# Patient Record
Sex: MALE | Race: WHITE | NOT HISPANIC OR LATINO | Employment: OTHER | URBAN - METROPOLITAN AREA
[De-identification: names, ages, dates, MRNs, and addresses within clinical notes are randomized per-mention and may not be internally consistent; named-entity substitution may affect disease eponyms.]

---

## 2018-09-14 ENCOUNTER — TELEPHONE (OUTPATIENT)
Dept: CARDIOLOGY | Facility: CLINIC | Age: 73
End: 2018-09-14

## 2018-09-14 NOTE — TELEPHONE ENCOUNTER
Called pt to make his appointment, as I received his referral to est care closer to home. Pt stated he just seen his Cardiologist Monday and was told that he did not need to follow up for a year. Pt stated he did not want to make an appointment at this time but would need to est care within the next year. Pt was told that I would put in a recall letter for his to give us a call to make his appointment. Pt stated that would be fine and gave me his new address as he is moving to [facility]. Pt's addressed and phone number were updated and recall was put in. Pt was told to give us a call if he needed anything. Pt verbalized understanding. No further issues noted.

## 2019-08-12 ENCOUNTER — PATIENT OUTREACH (OUTPATIENT)
Dept: ADMINISTRATIVE | Facility: HOSPITAL | Age: 74
End: 2019-08-12

## 2019-08-12 NOTE — LETTER
August 12, 2019    Norris Nelson  [address]  [place] [address]             [facility]  [address]  [facility] [address]  Phone: [phone] Dear Norris Nelson,    [facility] is committed to your overall health.  To help you get the most out of each of your visits, we will review your information to make sure you are up to date on all of your recommended tests and/or procedures.      As a new patient to [facility], we may not have your complete medical records.  She has found that your chart shows you may be due for:    ANNUAL LABS    If you have had any of the above done at another facility, please bring the records or information with you so that your record at [facility] will be complete.      If you are currently taking medication, please bring it with you to your appointment for review.    Also, if you have any type of Advanced Directives, please bring them with you to your office visit so we may scan them into your chart.      Thank You,  Your [facility]  Yaritza Patricia L.P.N. Clinical Care Coordinator  [address] [place] [address], [place] [address] [phone]

## 2019-08-27 ENCOUNTER — OFFICE VISIT (OUTPATIENT)
Dept: FAMILY MEDICINE | Facility: CLINIC | Age: 74
End: 2019-08-27
Payer: MEDICARE

## 2019-08-27 VITALS
HEIGHT: 69 IN | TEMPERATURE: 98 F | WEIGHT: 188.19 LBS | BODY MASS INDEX: 27.87 KG/M2 | DIASTOLIC BLOOD PRESSURE: 71 MMHG | RESPIRATION RATE: 12 BRPM | OXYGEN SATURATION: 97 % | HEART RATE: 44 BPM | SYSTOLIC BLOOD PRESSURE: 138 MMHG

## 2019-08-27 DIAGNOSIS — Z76.89 ENCOUNTER TO ESTABLISH CARE: Primary | ICD-10-CM

## 2019-08-27 DIAGNOSIS — R00.1 BRADYCARDIA WITH 41-50 BEATS PER MINUTE: ICD-10-CM

## 2019-08-27 PROCEDURE — 99203 PR OFFICE/OUTPT VISIT, NEW, LEVL III, 30-44 MIN: ICD-10-PCS | Mod: S$GLB,,, | Performed by: NURSE PRACTITIONER

## 2019-08-27 PROCEDURE — 99203 OFFICE O/P NEW LOW 30 MIN: CPT | Mod: S$GLB,,, | Performed by: NURSE PRACTITIONER

## 2019-08-27 RX ORDER — LOSARTAN POTASSIUM 100 MG/1
50 TABLET ORAL DAILY
COMMUNITY
Start: 2019-07-22 | End: 2020-12-07

## 2019-08-27 RX ORDER — ATORVASTATIN CALCIUM 80 MG/1
40 TABLET, FILM COATED ORAL DAILY
COMMUNITY
Start: 2019-07-31 | End: 2020-12-07

## 2019-08-27 NOTE — PROGRESS NOTES
Subjective:       Patient ID: Norris Nelson is a 74 y.o. male.    Chief Complaint: Establish Care  New patient presents to establish care. Recently moved from around the [facility]. History viewed. Low heart rate discussed, educated and encouraged to monitor at home when doing home BP checks. Records brought with history updated by NP. Appt schedule with Dr. Ryan- no specific complaints encouraged to discuss left carotid stenosis mild noted in . H/o prostate cancer with possible referral to Dr. Haynes.     Past Medical History:   Diagnosis Date    Carotid stenosis, left     mild    Cyst of left kidney     7.4 cm    Hyperlipidemia 2009    Hypertension     Kidney stones     3 events    Prostate cancer        Past Surgical History:   Procedure Laterality Date    COLONOSCOPY      normal     CYST REMOVAL      TESTICLE    PROSTATE SURGERY      CANCER REMOVAL    TONSILLECTOMY  1950        Social History     Socioeconomic History    Marital status:      Spouse name: Not on file    Number of children: 3    Years of education: Not on file    Highest education level: Master's degree (e.g., MA, MS, Vanessa, MEd, MSW, NELDA)   Occupational History    Occupation: Retired-[employer] worker   Social Needs    Financial resource strain: Not on file    Food insecurity:     Worry: Not on file     Inability: Not on file    Transportation needs:     Medical: Not on file     Non-medical: Not on file   Tobacco Use    Smoking status: Former Smoker     Types: Cigarettes     Last attempt to quit: 1974     Years since quittin.6    Smokeless tobacco: Never Used   Substance and Sexual Activity    Alcohol use: Yes     Comment: Occasionally    Drug use: Never    Sexual activity: Yes     Partners: Female   Lifestyle    Physical activity:     Days per week: Not on file     Minutes per session: Not on file    Stress: Not on file   Relationships    Social connections:      Talks on phone: Not on file     Gets together: Not on file     Attends Muslim service: Not on file     Active member of club or organization: Not on file     Attends meetings of clubs or organizations: Not on file     Relationship status: Not on file   Other Topics Concern    Not on file   Social History Narrative    Not on file       Family History   Problem Relation Age of Onset    Stroke Mother     Early death Mother         39y/o smoker     Heart attack Father         69 y/o    Obesity Sister     Heart attack Brother     Pacemaker/defibrilator Brother        Review of patient's allergies indicates:  No Known Allergies       Current Outpatient Medications:     atorvastatin (LIPITOR) 80 MG tablet, Take 40 mg by mouth once daily. , Disp: , Rfl:     losartan (COZAAR) 100 MG tablet, Take 50 mg by mouth once daily. , Disp: , Rfl:     HPI  Review of Systems   Constitutional: Negative.    HENT: Negative.    Eyes: Negative.    Respiratory: Negative.    Cardiovascular: Negative.    Gastrointestinal: Negative.    Endocrine: Negative.    Genitourinary: Negative.    Musculoskeletal: Negative.    Skin: Negative.    Allergic/Immunologic: Negative.    Neurological: Negative.    Hematological: Negative.    Psychiatric/Behavioral: Negative.        Objective:      Physical Exam   Constitutional: He is oriented to person, place, and time. He appears well-developed and well-nourished.   HENT:   Head: Normocephalic and atraumatic.   Mouth/Throat: Oropharynx is clear and moist.   Eyes: Pupils are equal, round, and reactive to light. Conjunctivae are normal. No scleral icterus.   Neck: Normal range of motion. Neck supple. No thyromegaly present.   Cardiovascular: Normal rate, regular rhythm and normal heart sounds.   No murmur heard.  Pulmonary/Chest: Effort normal and breath sounds normal. No respiratory distress.   Abdominal: Soft. Bowel sounds are normal. He exhibits no distension. There is no tenderness.    Musculoskeletal: Normal range of motion. He exhibits no edema.   Neurological: He is alert and oriented to person, place, and time. No sensory deficit. He exhibits normal muscle tone.   Skin: Skin is warm. Capillary refill takes less than 2 seconds. No rash noted.   Psychiatric: He has a normal mood and affect. His behavior is normal. Judgment and thought content normal.   Nursing note and vitals reviewed.      Assessment:       1. Encounter to establish care    2. Bradycardia with 41-50 beats per minute        Plan:     1- patient to call back when he is ready to have blood work ordered as he wants to ensure PSA has been greater than one year.    Encounter to establish care    Bradycardia with 41-50 beats per minute        Risks, benefits, and side effects were discussed with the patient. All questions were answered to the fullest satisfaction of the patient, and pt verbalized understanding and agreement to treatment plan. Pt was to call with any new or worsening symptoms, or present to the ER.

## 2019-10-07 ENCOUNTER — OFFICE VISIT (OUTPATIENT)
Dept: CARDIOLOGY | Facility: CLINIC | Age: 74
End: 2019-10-07
Payer: MEDICARE

## 2019-10-07 VITALS
RESPIRATION RATE: 12 BRPM | BODY MASS INDEX: 27.3 KG/M2 | HEIGHT: 69 IN | OXYGEN SATURATION: 96 % | WEIGHT: 184.31 LBS | DIASTOLIC BLOOD PRESSURE: 62 MMHG | TEMPERATURE: 98 F | SYSTOLIC BLOOD PRESSURE: 122 MMHG | HEART RATE: 51 BPM

## 2019-10-07 DIAGNOSIS — E65 ABDOMINAL OBESITY: ICD-10-CM

## 2019-10-07 DIAGNOSIS — R00.1 BRADYCARDIA WITH 51-60 BEATS PER MINUTE: ICD-10-CM

## 2019-10-07 DIAGNOSIS — I65.29 ASYMPTOMATIC CAROTID ARTERY STENOSIS, UNSPECIFIED LATERALITY: Primary | ICD-10-CM

## 2019-10-07 DIAGNOSIS — I10 ESSENTIAL HYPERTENSION: ICD-10-CM

## 2019-10-07 DIAGNOSIS — R09.89 BRUIT OF LEFT CAROTID ARTERY: ICD-10-CM

## 2019-10-07 PROBLEM — I65.22 STENOSIS OF LEFT CAROTID ARTERY: Status: ACTIVE | Noted: 2019-10-07

## 2019-10-07 PROCEDURE — 99213 OFFICE O/P EST LOW 20 MIN: CPT | Mod: PBBFAC | Performed by: INTERNAL MEDICINE

## 2019-10-07 PROCEDURE — 93005 ELECTROCARDIOGRAM TRACING: CPT

## 2019-10-07 PROCEDURE — 99205 PR OFFICE/OUTPT VISIT, NEW, LEVL V, 60-74 MIN: ICD-10-PCS | Mod: S$PBB,25,, | Performed by: INTERNAL MEDICINE

## 2019-10-07 PROCEDURE — 93010 ELECTROCARDIOGRAM REPORT: CPT | Mod: ,,, | Performed by: INTERNAL MEDICINE

## 2019-10-07 PROCEDURE — 99205 OFFICE O/P NEW HI 60 MIN: CPT | Mod: S$PBB,25,, | Performed by: INTERNAL MEDICINE

## 2019-10-07 PROCEDURE — 99999 PR PBB SHADOW E&M-EST. PATIENT-LVL III: ICD-10-PCS | Mod: PBBFAC,,, | Performed by: INTERNAL MEDICINE

## 2019-10-07 PROCEDURE — 93010 EKG 12-LEAD: ICD-10-PCS | Mod: ,,, | Performed by: INTERNAL MEDICINE

## 2019-10-07 PROCEDURE — 99999 PR PBB SHADOW E&M-EST. PATIENT-LVL III: CPT | Mod: PBBFAC,,, | Performed by: INTERNAL MEDICINE

## 2019-10-07 NOTE — PATIENT INSTRUCTIONS

## 2019-10-07 NOTE — PROGRESS NOTES
" Patient ID:  Norris Nelson is a 74 y.o. male who presents to Establish Care  For cardiac follow up, HTN, HLD, mild Carotid stenosis  PCP and referred by Michelle Barfield NP  Prior cardiologist, see yearly, Dr. Ji in [place]  Lives with wife, Soleadd, non-smoker  Retired  of water and sewage treatment    Patient is a new patient to me.    Health literacy: Medium  Activities: ADL's, walks the beach, 3 miles a day 45-60 minutes.  Nicotine: Quit smoking in , 1 pack/day x 10+ years  Alcohol: Socially, 1-2 beer/cocktail per week  Illicit drugs: Denies   Cardiac symptoms: None at present  Home BP: Yes - Avg 110/70's  Medication compliance: Yes  Diet: regular  Caffeine: 2 cups/day, 1 coke/day  Labs: No current labs <1 year  Last Echo: 5-10 years ago  Last stress test: 5-10 years ago  Cardiovascular angiogram: None  ECG: SB, rate 51 with PAC  Fundoscopic exam: 2017, no retinopathy detected    WM here for annual follow up, no specific heart condition but had premature family history for CAD and stoke, mother in her 40s and father  at age 68. No heart worries and no cardiac symptoms.     Ms. Michelle Barfield NP noted "Appt schedule with Dr. Ryan- no specific complaints encouraged to discuss left carotid stenosis mild noted in ."       Review of Systems   Constitution: Negative. Negative for diaphoresis, fever, malaise/fatigue, night sweats and weight gain.   HENT: Positive for hearing loss (Mederate hearing loss AU). Negative for nosebleeds and tinnitus.    Eyes: Negative for visual disturbance.        Wears readers   Cardiovascular: Negative.  Negative for chest pain, claudication, cyanosis, dyspnea on exertion, irregular heartbeat, leg swelling, near-syncope, orthopnea, palpitations and paroxysmal nocturnal dyspnea.   Respiratory: Negative.  Negative for cough, shortness of breath, sleep disturbances due to breathing, snoring and wheezing.         [place] = 8 " "  Endocrine: Negative.  Negative for polydipsia and polyuria.   Hematologic/Lymphatic: Negative.  Does not bruise/bleed easily.   Skin: Positive for color change (When bumps into something, states skin is very thin and easily tears). Negative for flushing, nail changes, poor wound healing and suspicious lesions.   Musculoskeletal: Positive for arthritis (Knees), back pain (Lower back with no radiation), joint pain (Knees) and muscle cramps (occassional Lower ext). Negative for falls, gout, joint swelling, muscle weakness and myalgias.   Gastrointestinal: Negative.  Negative for heartburn, hematemesis, hematochezia, melena and nausea.   Genitourinary: Negative.    Neurological: Negative.  Negative for disturbances in coordination, excessive daytime sleepiness, dizziness, focal weakness, headaches, light-headedness, loss of balance, numbness, vertigo and weakness.   Psychiatric/Behavioral: Negative.  Negative for depression and substance abuse. The patient does not have insomnia and is not nervous/anxious.    Allergic/Immunologic: Negative.         Objective:    Physical Exam   Constitutional: He is oriented to person, place, and time. He appears well-developed and well-nourished.   HENT:   Head: Normocephalic.   Eyes: Pupils are equal, round, and reactive to light. Conjunctivae and EOM are normal.   Neck: Normal range of motion. Neck supple. No JVD present. No thyromegaly present.   Circumference 15"   Cardiovascular: Regular rhythm and intact distal pulses. Bradycardia present. Exam reveals distant heart sounds. Exam reveals no gallop and no friction rub.   No murmur heard.  Pulses:       Carotid pulses are 1+ on the right side, and 1+ on the left side with bruit.       Radial pulses are 1+ on the right side, and 1+ on the left side.        Femoral pulses are 1+ on the right side, and 1+ on the left side.       Popliteal pulses are 1+ on the right side, and 1+ on the left side.        Dorsalis pedis pulses are 1+ on " "the right side, and 1+ on the left side.        Posterior tibial pulses are 1+ on the right side, and 1+ on the left side.   Pulmonary/Chest: Effort normal and breath sounds normal. He has no rales. He exhibits no tenderness.   Abdominal: Soft. Bowel sounds are normal. There is no tenderness.   Waist 41.75", hip 40"   Musculoskeletal: Normal range of motion. He exhibits no edema.   Lymphadenopathy:     He has no cervical adenopathy.   Neurological: He is alert and oriented to person, place, and time.   Skin: Skin is warm and dry. No rash noted.         Assessment:       1. Asymptomatic carotid artery stenosis, unspecified laterality    2. Bradycardia with 51-60 beats per minute    3. Essential hypertension, dx 2010    4. Bruit of left carotid artery    5. BMI 27.0-27.9,adult    6. Abdominal obesity         Plan:         Asymptomatic carotid artery stenosis, unspecified laterality  -     IN OFFICE EKG 12-LEAD (to Muse)  -     Stress Echo; Future  -     Lipid panel; Future; Expected date: 10/07/2019  -     US Carotid Bilateral; Future; Expected date: 10/07/2019    Bradycardia with 51-60 beats per minute    Essential hypertension, dx 2010  -     Stress Echo; Future    Bruit of left carotid artery  -     US Carotid Bilateral; Future; Expected date: 10/07/2019    BMI 27.0-27.9,adult    Abdominal obesity    - All medical issues reviewed, continue current Rx.  - CV status stable, all medications reviewed, patient acknowledge good understanding.  - Recommend healthy living: moderate alcohol, healthy diet and regular exercise aiming for fitness, and weight control  - Discussed healthy daily limit of 1 oz of pure alcohol in any 24 hours (roughly 2 12-oz beers, 8 oz of wine (8%-12% alcohol), or 2.5 oz of liquor (80 proof)), can not save up.   - Instruction for Mediterranean diet and heart healthy exercise given.  - Check home blood pressure, 2 days weekly, do 2 readings within 5 minutes in AM and PM, keep log for " review.  - Have to do some abdominal exercise to rid belly fat  - Highly recommend 30 minutes of exercise / activities daily, can have Sunday off, with 2-3 sessions of muscle strengthening weekly. A  would be very helpful.  - Recommend at least annual cardiovascular evaluation in view of patient's significant risk factors.  - Phone review / encourage use of [facility], no [facility]  - The tech support at [facility] is available 5 days a week, from 9 to 5, at [phone]. \    Total face-to-face time with the patient was 40 minutes and greater than 50% was spent in counseling and coordination of care. The above assessment and plan have been discussed at length. Referring provider's note reviewed. Labs and procedure over the last 6 months reviewed. Problem List updated. Asked to bring in all active medications / pills bottles with next visit.

## 2020-02-28 ENCOUNTER — HOSPITAL ENCOUNTER (OUTPATIENT)
Dept: CARDIOLOGY | Facility: HOSPITAL | Age: 75
Discharge: HOME | End: 2020-02-28
Attending: INTERNAL MEDICINE
Payer: MEDICARE

## 2020-02-28 ENCOUNTER — HOSPITAL ENCOUNTER (OUTPATIENT)
Dept: RADIOLOGY | Facility: HOSPITAL | Age: 75
Discharge: HOME | End: 2020-02-28
Attending: INTERNAL MEDICINE
Payer: MEDICARE

## 2020-02-28 VITALS
SYSTOLIC BLOOD PRESSURE: 154 MMHG | BODY MASS INDEX: 27.17 KG/M2 | WEIGHT: 184 LBS | DIASTOLIC BLOOD PRESSURE: 76 MMHG | HEART RATE: 58 BPM

## 2020-02-28 DIAGNOSIS — I65.29 ASYMPTOMATIC CAROTID ARTERY STENOSIS, UNSPECIFIED LATERALITY: ICD-10-CM

## 2020-02-28 DIAGNOSIS — I10 ESSENTIAL HYPERTENSION: ICD-10-CM

## 2020-02-28 DIAGNOSIS — R09.89 BRUIT OF LEFT CAROTID ARTERY: ICD-10-CM

## 2020-02-28 LAB
AORTIC ROOT ANNULUS: 3.35 CM
AORTIC VALVE CUSP SEPERATION: 2.05 CM
CV ECHO LV RWT: 0.35 CM
CV STRESS BASE HR: 44 BPM
DIASTOLIC BLOOD PRESSURE: 99 MMHG
DOP CALC LVOT AREA: 3.1 CM2
DOP CALC LVOT DIAMETER: 1.98 CM
ECHO LV POSTERIOR WALL: 0.94 CM (ref 0.6–1.1)
FRACTIONAL SHORTENING: 34 % (ref 28–44)
INTERVENTRICULAR SEPTUM: 0.94 CM (ref 0.6–1.1)
LA MAJOR: 4.73 CM
LA MINOR: 3.48 CM
LEFT ATRIUM SIZE: 4.43 CM
LEFT CCA DIST DIAS: 18 CM/S
LEFT CCA DIST SYS: 93 CM/S
LEFT CCA MID DIAS: 18 CM/S
LEFT CCA MID SYS: 107 CM/S
LEFT CCA PROX DIAS: 19 CM/S
LEFT CCA PROX SYS: 114 CM/S
LEFT ECA DIAS: 11 CM/S
LEFT ECA SYS: 75 CM/S
LEFT ICA DIST DIAS: 22 CM/S
LEFT ICA DIST SYS: 86 CM/S
LEFT ICA MID DIAS: 21 CM/S
LEFT ICA MID SYS: 68 CM/S
LEFT ICA PROX DIAS: 22 CM/S
LEFT ICA PROX SYS: 64 CM/S
LEFT INTERNAL DIMENSION IN SYSTOLE: 3.53 CM (ref 2.1–4)
LEFT VENTRICLE DIASTOLIC VOLUME: 139.47 ML
LEFT VENTRICLE SYSTOLIC VOLUME: 51.98 ML
LEFT VENTRICULAR INTERNAL DIMENSION IN DIASTOLE: 5.37 CM (ref 3.5–6)
LEFT VENTRICULAR MASS: 188.82 G
LEFT VERTEBRAL DIAS: 16 CM/S
LEFT VERTEBRAL SYS: 69 CM/S
OHS CV CAROTID RIGHT ICA EDV HIGHEST: 23
OHS CV CAROTID ULTRASOUND LEFT ICA/CCA RATIO: 0.75
OHS CV CAROTID ULTRASOUND RIGHT ICA/CCA RATIO: 0.8
OHS CV CPX 1 MINUTE RECOVERY HEART RATE: 110 BPM
OHS CV CPX 85 PERCENT MAX PREDICTED HEART RATE MALE: 122
OHS CV CPX ESTIMATED METS: 12
OHS CV CPX MAX PREDICTED HEART RATE: 143
OHS CV CPX PATIENT IS FEMALE: 0
OHS CV CPX PATIENT IS MALE: 1
OHS CV CPX PEAK DIASTOLIC BLOOD PRESSURE: 75 MMHG
OHS CV CPX PEAK HEAR RATE: 150 BPM
OHS CV CPX PEAK RATE PRESSURE PRODUCT: NORMAL
OHS CV CPX PEAK SYSTOLIC BLOOD PRESSURE: 241 MMHG
OHS CV CPX PERCENT MAX PREDICTED HEART RATE ACHIEVED: 105
OHS CV CPX RATE PRESSURE PRODUCT PRESENTING: 6732
OHS CV PV CAROTID LEFT HIGHEST CCA: 114
OHS CV PV CAROTID LEFT HIGHEST ICA: 86
OHS CV PV CAROTID RIGHT HIGHEST CCA: 106
OHS CV PV CAROTID RIGHT HIGHEST ICA: 85
OHS CV US CAROTID LEFT HIGHEST EDV: 22
RA MAJOR: 4.93 CM
RA WIDTH: 3.8 CM
RIGHT CCA DIST DIAS: 20 CM/S
RIGHT CCA DIST SYS: 99 CM/S
RIGHT CCA MID DIAS: 21 CM/S
RIGHT CCA MID SYS: 106 CM/S
RIGHT CCA PROX DIAS: 19 CM/S
RIGHT CCA PROX SYS: 102 CM/S
RIGHT ECA DIAS: 16 CM/S
RIGHT ECA SYS: 77 CM/S
RIGHT ICA DIST DIAS: 20 CM/S
RIGHT ICA DIST SYS: 72 CM/S
RIGHT ICA MID DIAS: 23 CM/S
RIGHT ICA MID SYS: 66 CM/S
RIGHT ICA PROX DIAS: 18 CM/S
RIGHT ICA PROX SYS: 85 CM/S
RIGHT VENTRICULAR END-DIASTOLIC DIMENSION: 3.11 CM
RIGHT VERTEBRAL DIAS: 14 CM/S
RIGHT VERTEBRAL SYS: 61 CM/S
STRESS ECHO POST EXERCISE DUR MIN: 7 MINUTES
STRESS ECHO POST EXERCISE DUR SEC: 45 SECONDS
SYSTOLIC BLOOD PRESSURE: 153 MMHG

## 2020-02-28 PROCEDURE — 93351 STRESS TTE COMPLETE: CPT | Mod: 26,,, | Performed by: INTERNAL MEDICINE

## 2020-02-28 PROCEDURE — 93880 EXTRACRANIAL BILAT STUDY: CPT

## 2020-02-28 PROCEDURE — 93351 STRESS ECHO (CUPID ONLY): ICD-10-PCS | Mod: 26,,, | Performed by: INTERNAL MEDICINE

## 2020-02-28 PROCEDURE — 93880 EXTRACRANIAL BILAT STUDY: CPT | Mod: 26,,, | Performed by: INTERNAL MEDICINE

## 2020-02-28 PROCEDURE — 93880 CV US DOPPLER CAROTID (CUPID ONLY): ICD-10-PCS | Mod: 26,,, | Performed by: INTERNAL MEDICINE

## 2020-02-28 PROCEDURE — 93351 STRESS TTE COMPLETE: CPT

## 2020-02-28 NOTE — NURSING
2 patient identifier name and date of birth confirmed as stated by patient and matched with armband. Informed consent obtained.

## 2020-03-16 ENCOUNTER — TELEPHONE (OUTPATIENT)
Dept: FAMILY MEDICINE | Facility: CLINIC | Age: 75
End: 2020-03-16

## 2020-03-16 NOTE — TELEPHONE ENCOUNTER
Attempted to call no answer, unable to leave message.        ----- Message from Alberto Ferguson sent at 3/16/2020  3:59 PM CDT -----  Contact: pt wife   Type: Needs Medical Advice    Who Called:  Pt wife Celsa   Symptoms (please be specific):    How long has patient had these symptoms:    Pharmacy name and phone #:    Best Call Back Number:    Additional Information: he thinks he might have a kidney stone pain , wife doesn't know what to do about it

## 2020-03-17 ENCOUNTER — HOSPITAL ENCOUNTER (EMERGENCY)
Facility: HOSPITAL | Age: 75
Discharge: HOME | End: 2020-03-17
Attending: FAMILY MEDICINE
Payer: MEDICARE

## 2020-03-17 VITALS
WEIGHT: 184 LBS | OXYGEN SATURATION: 96 % | HEIGHT: 69 IN | RESPIRATION RATE: 18 BRPM | BODY MASS INDEX: 27.25 KG/M2 | HEART RATE: 58 BPM | SYSTOLIC BLOOD PRESSURE: 127 MMHG | TEMPERATURE: 98 F | DIASTOLIC BLOOD PRESSURE: 68 MMHG

## 2020-03-17 DIAGNOSIS — N20.1 URETEROLITHIASIS: Primary | ICD-10-CM

## 2020-03-17 DIAGNOSIS — N13.2 HYDRONEPHROSIS WITH URINARY OBSTRUCTION DUE TO URETERAL CALCULUS: ICD-10-CM

## 2020-03-17 LAB
ALBUMIN SERPL BCP-MCNC: 4.4 G/DL (ref 3.5–5.2)
ALP SERPL-CCNC: 68 U/L (ref 55–135)
ALT SERPL W/O P-5'-P-CCNC: 17 U/L (ref 10–44)
ANION GAP SERPL CALC-SCNC: 14 MMOL/L (ref 8–16)
AST SERPL-CCNC: 25 U/L (ref 10–40)
BASOPHILS # BLD AUTO: 0.03 K/UL (ref 0–0.2)
BASOPHILS NFR BLD: 0.3 % (ref 0–1.9)
BILIRUB SERPL-MCNC: 1.1 MG/DL (ref 0.1–1)
BUN SERPL-MCNC: 22 MG/DL (ref 8–23)
CALCIUM SERPL-MCNC: 9.3 MG/DL (ref 8.7–10.5)
CHLORIDE SERPL-SCNC: 105 MMOL/L (ref 95–110)
CO2 SERPL-SCNC: 20 MMOL/L (ref 23–29)
CREAT SERPL-MCNC: 1.8 MG/DL (ref 0.5–1.4)
DIFFERENTIAL METHOD: ABNORMAL
EOSINOPHIL # BLD AUTO: 0 K/UL (ref 0–0.5)
EOSINOPHIL NFR BLD: 0.2 % (ref 0–8)
ERYTHROCYTE [DISTWIDTH] IN BLOOD BY AUTOMATED COUNT: 12.6 % (ref 11.5–14.5)
EST. GFR  (AFRICAN AMERICAN): 41.1 ML/MIN/1.73 M^2
EST. GFR  (NON AFRICAN AMERICAN): 35.5 ML/MIN/1.73 M^2
GLUCOSE SERPL-MCNC: 142 MG/DL (ref 70–110)
HCT VFR BLD AUTO: 40.6 % (ref 40–54)
HGB BLD-MCNC: 13.5 G/DL (ref 14–18)
IMM GRANULOCYTES # BLD AUTO: 0.03 K/UL (ref 0–0.04)
IMM GRANULOCYTES NFR BLD AUTO: 0.3 % (ref 0–0.5)
LYMPHOCYTES # BLD AUTO: 0.7 K/UL (ref 1–4.8)
LYMPHOCYTES NFR BLD: 7.7 % (ref 18–48)
MCH RBC QN AUTO: 31.3 PG (ref 27–31)
MCHC RBC AUTO-ENTMCNC: 33.3 G/DL (ref 32–36)
MCV RBC AUTO: 94 FL (ref 82–98)
MONOCYTES # BLD AUTO: 0.5 K/UL (ref 0.3–1)
MONOCYTES NFR BLD: 5.1 % (ref 4–15)
NEUTROPHILS # BLD AUTO: 8.1 K/UL (ref 1.8–7.7)
NEUTROPHILS NFR BLD: 86.4 % (ref 38–73)
NRBC BLD-RTO: 0 /100 WBC
PLATELET # BLD AUTO: 201 K/UL (ref 150–350)
PMV BLD AUTO: 10.7 FL (ref 9.2–12.9)
POTASSIUM SERPL-SCNC: 3.9 MMOL/L (ref 3.5–5.1)
PROT SERPL-MCNC: 7.7 G/DL (ref 6–8.4)
RBC # BLD AUTO: 4.31 M/UL (ref 4.6–6.2)
SODIUM SERPL-SCNC: 139 MMOL/L (ref 136–145)
WBC # BLD AUTO: 9.34 K/UL (ref 3.9–12.7)

## 2020-03-17 PROCEDURE — 85025 COMPLETE CBC W/AUTO DIFF WBC: CPT

## 2020-03-17 PROCEDURE — 63600175 PHARM REV CODE 636 W HCPCS: Performed by: FAMILY MEDICINE

## 2020-03-17 PROCEDURE — 96361 HYDRATE IV INFUSION ADD-ON: CPT

## 2020-03-17 PROCEDURE — 25000003 PHARM REV CODE 250: Performed by: FAMILY MEDICINE

## 2020-03-17 PROCEDURE — 96372 THER/PROPH/DIAG INJ SC/IM: CPT | Mod: 59

## 2020-03-17 PROCEDURE — 74176 CT ABD & PELVIS W/O CONTRAST: CPT | Mod: TC

## 2020-03-17 PROCEDURE — 74176 CT RENAL STONE STUDY ABD PELVIS WO: ICD-10-PCS | Mod: 26,,, | Performed by: RADIOLOGY

## 2020-03-17 PROCEDURE — 80053 COMPREHEN METABOLIC PANEL: CPT

## 2020-03-17 PROCEDURE — 96374 THER/PROPH/DIAG INJ IV PUSH: CPT

## 2020-03-17 PROCEDURE — 74176 CT ABD & PELVIS W/O CONTRAST: CPT | Mod: 26,,, | Performed by: RADIOLOGY

## 2020-03-17 PROCEDURE — 99284 EMERGENCY DEPT VISIT MOD MDM: CPT | Mod: 25

## 2020-03-17 RX ORDER — HYDROCODONE BITARTRATE AND ACETAMINOPHEN 5; 325 MG/1; MG/1
1 TABLET ORAL
Status: COMPLETED | OUTPATIENT
Start: 2020-03-17 | End: 2020-03-17

## 2020-03-17 RX ORDER — KETOROLAC TROMETHAMINE 30 MG/ML
15 INJECTION, SOLUTION INTRAMUSCULAR; INTRAVENOUS
Status: COMPLETED | OUTPATIENT
Start: 2020-03-17 | End: 2020-03-17

## 2020-03-17 RX ORDER — KETOROLAC TROMETHAMINE 10 MG/1
10 TABLET, FILM COATED ORAL EVERY 6 HOURS PRN
Qty: 20 TABLET | Refills: 0 | Status: SHIPPED | OUTPATIENT
Start: 2020-03-17 | End: 2020-12-08

## 2020-03-17 RX ORDER — TAMSULOSIN HYDROCHLORIDE 0.4 MG/1
0.4 CAPSULE ORAL DAILY
COMMUNITY
End: 2020-03-17 | Stop reason: SDUPTHER

## 2020-03-17 RX ORDER — TAMSULOSIN HYDROCHLORIDE 0.4 MG/1
0.4 CAPSULE ORAL
Status: COMPLETED | OUTPATIENT
Start: 2020-03-17 | End: 2020-03-17

## 2020-03-17 RX ORDER — HYDROCODONE BITARTRATE AND ACETAMINOPHEN 7.5; 325 MG/1; MG/1
1 TABLET ORAL EVERY 6 HOURS PRN
Qty: 20 TABLET | Refills: 0 | Status: SHIPPED | OUTPATIENT
Start: 2020-03-17 | End: 2020-11-17

## 2020-03-17 RX ORDER — TAMSULOSIN HYDROCHLORIDE 0.4 MG/1
0.4 CAPSULE ORAL DAILY
Qty: 20 CAPSULE | Refills: 0 | Status: SHIPPED | OUTPATIENT
Start: 2020-03-17 | End: 2020-11-17

## 2020-03-17 RX ORDER — MORPHINE SULFATE 4 MG/ML
4 INJECTION, SOLUTION INTRAMUSCULAR; INTRAVENOUS
Status: DISCONTINUED | OUTPATIENT
Start: 2020-03-17 | End: 2020-03-17

## 2020-03-17 RX ORDER — HYDROCODONE BITARTRATE AND ACETAMINOPHEN 7.5; 325 MG/1; MG/1
1 TABLET ORAL EVERY 6 HOURS PRN
COMMUNITY
End: 2020-03-17 | Stop reason: SDUPTHER

## 2020-03-17 RX ORDER — KETOROLAC TROMETHAMINE 30 MG/ML
30 INJECTION, SOLUTION INTRAMUSCULAR; INTRAVENOUS
Status: COMPLETED | OUTPATIENT
Start: 2020-03-17 | End: 2020-03-17

## 2020-03-17 RX ORDER — KETOROLAC TROMETHAMINE 30 MG/ML
15 INJECTION, SOLUTION INTRAMUSCULAR; INTRAVENOUS
Status: DISCONTINUED | OUTPATIENT
Start: 2020-03-17 | End: 2020-03-17

## 2020-03-17 RX ORDER — ONDANSETRON 2 MG/ML
4 INJECTION INTRAMUSCULAR; INTRAVENOUS
Status: DISCONTINUED | OUTPATIENT
Start: 2020-03-17 | End: 2020-03-17

## 2020-03-17 RX ADMIN — HYDROCODONE BITARTRATE AND ACETAMINOPHEN 1 TABLET: 5; 325 TABLET ORAL at 03:03

## 2020-03-17 RX ADMIN — KETOROLAC TROMETHAMINE 30 MG: 30 INJECTION, SOLUTION INTRAMUSCULAR at 03:03

## 2020-03-17 RX ADMIN — SODIUM CHLORIDE 500 ML: 0.9 INJECTION, SOLUTION INTRAVENOUS at 02:03

## 2020-03-17 RX ADMIN — KETOROLAC TROMETHAMINE 15 MG: 30 INJECTION, SOLUTION INTRAMUSCULAR at 02:03

## 2020-03-17 RX ADMIN — TAMSULOSIN HYDROCHLORIDE 0.4 MG: 0.4 CAPSULE ORAL at 03:03

## 2020-03-17 NOTE — ED PROVIDER NOTES
Encounter Date: 3/17/2020       History     Chief Complaint   Patient presents with    Flank Pain     right flank pain      This is a 75 year old male with history of kidney stones who comes in with right flank pain that was 10/10 initially but resolved now.  He had no associated N/V.  No fever/chills.  He took norco and flomax prior to coming in and was in a hot bath.          Review of patient's allergies indicates:  No Known Allergies  Past Medical History:   Diagnosis Date    Carotid stenosis, left     mild    Cyst of left kidney     7.4 cm    Hyperlipidemia     Hypertension     Kidney stones     3 events    Kidney stones 2018    Prostate cancer 1995     Past Surgical History:   Procedure Laterality Date    COLONOSCOPY      normal     CYST REMOVAL      TESTICLE    PROSTATE SURGERY      CANCER REMOVAL    TONSILLECTOMY  1950     Family History   Problem Relation Age of Onset    Stroke Mother     Early death Mother         41y/o smoker     Heart attack Father         67 y/o    Obesity Sister     Heart attack Brother     Pacemaker/defibrilator Brother      Social History     Tobacco Use    Smoking status: Former Smoker     Types: Cigarettes     Last attempt to quit: 1974     Years since quittin.2    Smokeless tobacco: Never Used   Substance Use Topics    Alcohol use: Yes     Comment: Occasionally    Drug use: Never     Review of Systems   All other systems reviewed and are negative.      Physical Exam     Initial Vitals [20 0229]   BP Pulse Resp Temp SpO2   127/68 (!) 58 18 98 °F (36.7 °C) 96 %      MAP       --         Physical Exam    Nursing note and vitals reviewed.  Constitutional: He appears well-developed and well-nourished. No distress.   HENT:   Head: Normocephalic and atraumatic.   Eyes: Conjunctivae are normal. Pupils are equal, round, and reactive to light.   Neck: Normal range of motion. Neck supple.   Cardiovascular: Normal rate,  regular rhythm and intact distal pulses.   Pulmonary/Chest: Breath sounds normal.   Abdominal: Soft. Bowel sounds are normal. He exhibits no distension. There is no tenderness.   Musculoskeletal: Normal range of motion.   Neurological: He is alert and oriented to person, place, and time. He has normal strength.   Skin: Skin is warm. Capillary refill takes less than 2 seconds.   Psychiatric: He has a normal mood and affect. Thought content normal.         ED Course   Procedures  Labs Reviewed   CBC W/ AUTO DIFFERENTIAL - Abnormal; Notable for the following components:       Result Value    RBC 4.31 (*)     Hemoglobin 13.5 (*)     Mean Corpuscular Hemoglobin 31.3 (*)     Gran # (ANC) 8.1 (*)     Lymph # 0.7 (*)     Gran% 86.4 (*)     Lymph% 7.7 (*)     All other components within normal limits   COMPREHENSIVE METABOLIC PANEL - Abnormal; Notable for the following components:    CO2 20 (*)     Glucose 142 (*)     Creatinine 1.8 (*)     Total Bilirubin 1.1 (*)     eGFR if  41.1 (*)     eGFR if non  35.5 (*)     All other components within normal limits          Imaging Results          CT Renal Stone Study ABD Pelvis WO (In process)                  Medical Decision Making:   Clinical Tests:   Lab Tests: Ordered and Reviewed  Radiological Study: Ordered and Reviewed  ED Management:  Patient has a ureteral stone with hydronephrosis.  Given toradol flomax and IV fluids.  No e/o sepsis, his cr is slightly elevated.  Feeling better.  Will f/u with urologist.                    ED Course as of Mar 17 0326   Tue Mar 17, 2020   0322 1. There is mild right hydronephrosis and ureterectasis. There may be a 3 mm right mid distal ureteral  stone (series 2, image 101).  2. There is more than usual amount of stool in the colon   CT Renal Stone Study ABD Pelvis WO [VS]      ED Course User Index  [VS] Gaudencio Monsivais MD                Clinical Impression:       ICD-10-CM ICD-9-CM   1. Ureterolithiasis  N20.1 592.1   2. Hydronephrosis with urinary obstruction due to ureteral calculus N13.2 592.1     591         Disposition:   Disposition: Discharged  Condition: Stable     ED Disposition Condition    Discharge Stable        ED Prescriptions     None        Follow-up Information     Follow up With Specialties Details Why Contact Info    Sebastián Terrazas Jr., MD Urology Schedule an appointment as soon as possible for a visit in 3 days  [address] [facility] [id number] [phone]                                       Gaudencio Monsivais MD  03/17/20 0326

## 2020-03-17 NOTE — ED TRIAGE NOTES
Pt to ED with c/o right flank pain that initially started about 5 days pta that has progressively gotten worse. Pt reports history of kidney stones in 2018, states pain is similar. Pt reports he took flomax and norco about 2130 last night. Pt states at this time pain is controlled.

## 2020-04-20 ENCOUNTER — TELEPHONE (OUTPATIENT)
Dept: CARDIOLOGY | Facility: CLINIC | Age: 75
End: 2020-04-20

## 2020-04-20 NOTE — TELEPHONE ENCOUNTER
Pt requesting copies of his medical records.  [place] [facility] [place].    No other concerns voiced during this telephone encounter.        ----- Message from Carola Durand sent at 4/20/2020 10:07 AM CDT -----  Contact: patient  Type:  Test Results    Who Called:  Patient   Name of Test (Lab/Mammo/Etc): stress, carotid US, labs  Date of Test:  2/28  Ordering Provider:  [facility]  Where the test was performed:  [facility]  Best Call Back Number:  [phone]  Additional Information:

## 2020-11-16 NOTE — PROGRESS NOTES
[facility] Note    PCP: Michelle Barfield NP    Chief Complaint: establish care    SUBJECTIVE:       History of Present Illness:  Norris Nelson is a 75 y.o. male who presents to clinic to establish care. He is New  to our clinic.     Had a prostatectomy 25 years ago. Has yearly PSA checked. No adjuvant therapy.   No issues voiding. No hematuria or dysuria.   No issues with erections.   Hx of stones - stone episode in March, 4th episode, has passed them all.     CT in March showed some complex renal cysts. He has had these evaluated before with MRI in 2017 and CT in 2018 however nothing since. Was read as a Bosniak IIF cyst on right, all other simple cysts.     UA today: negative for blood, leuks, nitrite   AUASS: 2/1    Last urine culture: no documented UTIs  Last creatinine: 1.8 (3/17/20)    Family  hx: no  malignancy   Hx of HLD, HTN, carotid stenosis, prostate cancer    Past medical, family, and social history reviewed as documented in chart with pertinent positive medical, family, and social history detailed in HPI.    Review of patient's allergies indicates:  No Known Allergies    Past Medical History:   Diagnosis Date    Carotid stenosis, left 2015    mild    Cyst of left kidney 2015    7.4 cm    Hyperlipidemia 2009    Hypertension 2009    Kidney stones     3 events    Kidney stones 05/08/2018    Prostate cancer 1995     Past Surgical History:   Procedure Laterality Date    COLONOSCOPY  2014    normal     CYST REMOVAL  1999    TESTICLE    PROSTATE SURGERY  1995    CANCER REMOVAL    TONSILLECTOMY  1950     Family History   Problem Relation Age of Onset    Stroke Mother     Early death Mother         41y/o smoker     Heart attack Father         69 y/o    Obesity Sister     Heart attack Brother     Pacemaker/defibrilator Brother      Social History     Tobacco Use    Smoking status: Former Smoker     Types: Cigarettes     Quit date: 1974     Years since  "quittin.9    Smokeless tobacco: Never Used   Substance Use Topics    Alcohol use: Yes     Comment: Occasionally    Drug use: Never        Review of Systems   Constitutional: Negative for chills and fever.   HENT: Negative for trouble swallowing.    Eyes: Negative for pain.   Respiratory: Negative for cough and shortness of breath.    Cardiovascular: Negative for chest pain and palpitations.   Gastrointestinal: Negative for abdominal pain, nausea and vomiting.   Genitourinary: Negative for difficulty urinating, frequency, hematuria, nocturia, testicular pain and urgency.   Skin: Negative for rash.   Neurological: Negative for weakness.   Psychiatric/Behavioral: Negative for behavioral problems.       OBJECTIVE:     Anticoagulation:  no    Estimated body mass index is 26.14 kg/m² as calculated from the following:    Height as of this encounter: 5' 9" (1.753 m).    Weight as of this encounter: 80.3 kg (177 lb).    Vital Signs (Most Recent)  Temp: 98 °F (36.7 °C) (20)  Pulse: (!) 41 (20)  Resp: 16 (20)  BP: (!) 178/73 (20)    Physical Exam  Vitals signs and nursing note reviewed.   Constitutional:       General: He is not in acute distress.     Appearance: He is well-developed. He is not ill-appearing.   Eyes:      General: No scleral icterus.  Neck:      Trachea: Trachea normal. No tracheal deviation.   Cardiovascular:      Rate and Rhythm: Normal rate and regular rhythm.   Pulmonary:      Effort: Pulmonary effort is normal. No accessory muscle usage or respiratory distress.   Abdominal:      General: There is no distension.      Palpations: Abdomen is soft. There is no hepatomegaly or splenomegaly.      Tenderness: There is no abdominal tenderness.   Genitourinary:     Rectum: No mass. Normal anal tone.      Comments: Penis is circumcised, there are no lesions, masses, plaques. Scrotum showed no rashes or lesions. Testicles and epididymes were symmetric and showed no " masses or tenderness. Urethral meatus is orthotopic, patent and without lesions or discharge.  Musculoskeletal:         General: No tenderness.   Skin:     General: Skin is warm and dry.      Findings: No lesion or rash.   Neurological:      General: No focal deficit present.      Mental Status: He is alert and oriented to person, place, and time.   Psychiatric:         Mood and Affect: Mood normal.         Behavior: Behavior normal.         Thought Content: Thought content normal.         BMP  Lab Results   Component Value Date     03/17/2020    K 3.9 03/17/2020     03/17/2020    CO2 20 (L) 03/17/2020    BUN 22 03/17/2020    CREATININE 1.8 (H) 03/17/2020    CALCIUM 9.3 03/17/2020    ANIONGAP 14 03/17/2020    ESTGFRAFRICA 41.1 (A) 03/17/2020    EGFRNONAA 35.5 (A) 03/17/2020       Lab Results   Component Value Date    WBC 9.34 03/17/2020    HGB 13.5 (L) 03/17/2020    HCT 40.6 03/17/2020    MCV 94 03/17/2020     03/17/2020       Imaging:  CTRSS 3/17/20:  Kidneys are normal in size and attenuation.  There bilateral renal calculi measuring 3-5 mm in diameter.  There are numerous prominent bilateral simple and mildly complicated renal cysts some of which demonstrate minimal peripheral wall calcification.  The largest cyst measures 6.0 cm in diameter.  There is mild right-sided hydronephrosis with mild right hydroureter secondary to a partially obstructing 5 mm distal right ureteral calculus at the level of S2-S3.  There are mild proximal right peripelvic and periureteric inflammatory changes.  No left-sided changes of hydronephrosis.  The left ureter is normal in course and caliber.   The bladder is decompressed.  Surgical clips from previous prostatectomy.  No free fluid in the dependent pelvis.    ASSESSMENT     1. Prostate cancer    2. Complex renal cyst        PLAN:     - Repeat CT abdomen with contrast for cyst surveillance, if stable will move to yearly US  - Upload previous imaging to Epic  -  Check PSA today  - Will monitor his few renal stones. General risk factors for kidney stones and the conservative measures to prevent kidney stones in the future were discussed with the patient in detail.  The patient was encouraged to drink 2-3 liters of water a day, limit iced tea and chavez as well as foods high in oxalate.  They were cautioned to try to limit salt and red meat intake.  We also discussed adding citrate to the diet with the addition of ingris or lemon juice to their water or alternatively with crystal light.   - Otherwise doing very well from a urologic standpoint  - Follow up in 1 year       Ashley Tucker MD

## 2020-11-17 ENCOUNTER — LAB VISIT (OUTPATIENT)
Dept: LAB | Facility: HOSPITAL | Age: 75
End: 2020-11-17
Attending: STUDENT IN AN ORGANIZED HEALTH CARE EDUCATION/TRAINING PROGRAM
Payer: MEDICARE

## 2020-11-17 ENCOUNTER — OFFICE VISIT (OUTPATIENT)
Dept: UROLOGY | Facility: CLINIC | Age: 75
End: 2020-11-17
Payer: MEDICARE

## 2020-11-17 VITALS
HEIGHT: 69 IN | HEART RATE: 41 BPM | TEMPERATURE: 98 F | DIASTOLIC BLOOD PRESSURE: 73 MMHG | SYSTOLIC BLOOD PRESSURE: 178 MMHG | WEIGHT: 177 LBS | RESPIRATION RATE: 16 BRPM | BODY MASS INDEX: 26.22 KG/M2

## 2020-11-17 DIAGNOSIS — C61 PROSTATE CANCER: ICD-10-CM

## 2020-11-17 DIAGNOSIS — C61 PROSTATE CANCER: Primary | ICD-10-CM

## 2020-11-17 DIAGNOSIS — N28.1 COMPLEX RENAL CYST: ICD-10-CM

## 2020-11-17 LAB — COMPLEXED PSA SERPL-MCNC: <0.01 NG/ML (ref 0–4)

## 2020-11-17 PROCEDURE — 99999 PR PBB SHADOW E&M-EST. PATIENT-LVL III: CPT | Mod: PBBFAC,,, | Performed by: STUDENT IN AN ORGANIZED HEALTH CARE EDUCATION/TRAINING PROGRAM

## 2020-11-17 PROCEDURE — 99204 OFFICE O/P NEW MOD 45 MIN: CPT | Mod: S$PBB,,, | Performed by: STUDENT IN AN ORGANIZED HEALTH CARE EDUCATION/TRAINING PROGRAM

## 2020-11-17 PROCEDURE — 99999 PR PBB SHADOW E&M-EST. PATIENT-LVL III: ICD-10-PCS | Mod: PBBFAC,,, | Performed by: STUDENT IN AN ORGANIZED HEALTH CARE EDUCATION/TRAINING PROGRAM

## 2020-11-17 PROCEDURE — 36415 COLL VENOUS BLD VENIPUNCTURE: CPT

## 2020-11-17 PROCEDURE — 99213 OFFICE O/P EST LOW 20 MIN: CPT | Mod: PBBFAC | Performed by: STUDENT IN AN ORGANIZED HEALTH CARE EDUCATION/TRAINING PROGRAM

## 2020-11-17 PROCEDURE — 99204 PR OFFICE/OUTPT VISIT, NEW, LEVL IV, 45-59 MIN: ICD-10-PCS | Mod: S$PBB,,, | Performed by: STUDENT IN AN ORGANIZED HEALTH CARE EDUCATION/TRAINING PROGRAM

## 2020-11-17 PROCEDURE — 84153 ASSAY OF PSA TOTAL: CPT

## 2020-11-23 DIAGNOSIS — N28.1 CYST OF KIDNEY, ACQUIRED: ICD-10-CM

## 2020-11-24 ENCOUNTER — TELEPHONE (OUTPATIENT)
Dept: UROLOGY | Facility: CLINIC | Age: 75
End: 2020-11-24

## 2020-11-24 ENCOUNTER — HOSPITAL ENCOUNTER (OUTPATIENT)
Dept: RADIOLOGY | Facility: HOSPITAL | Age: 75
Discharge: HOME | End: 2020-11-24
Attending: STUDENT IN AN ORGANIZED HEALTH CARE EDUCATION/TRAINING PROGRAM
Payer: MEDICARE

## 2020-11-24 DIAGNOSIS — N28.1 CYST OF KIDNEY, ACQUIRED: ICD-10-CM

## 2020-11-24 PROCEDURE — 25500020 PHARM REV CODE 255: Performed by: STUDENT IN AN ORGANIZED HEALTH CARE EDUCATION/TRAINING PROGRAM

## 2020-11-24 PROCEDURE — 74170 CT ABDOMEN W WO CONTRAST: ICD-10-PCS | Mod: 26,,, | Performed by: RADIOLOGY

## 2020-11-24 PROCEDURE — 74170 CT ABD WO CNTRST FLWD CNTRST: CPT | Mod: TC

## 2020-11-24 PROCEDURE — 74170 CT ABD WO CNTRST FLWD CNTRST: CPT | Mod: 26,,, | Performed by: RADIOLOGY

## 2020-11-24 RX ADMIN — IOHEXOL 75 ML: 350 INJECTION, SOLUTION INTRAVENOUS at 10:11

## 2020-11-24 NOTE — TELEPHONE ENCOUNTER
See result note     ----- Message from Odette Quiroga sent at 11/24/2020  1:49 PM CST -----  Regarding: Pt advice  Contact: pt  Type:  Patient Returning Call    Who Called:  pt  Does the patient know what this is regarding?:  pt Is returning call to office  Best Call Back Number:    Additional Information:  Please follow up. Thank you

## 2020-11-24 NOTE — PROGRESS NOTES
Please let patient know his CT shows stability of his renal cysts. Will see him back in 1 year and repeat US and PSA.

## 2020-12-07 DIAGNOSIS — I10 ESSENTIAL HYPERTENSION: Primary | ICD-10-CM

## 2020-12-07 DIAGNOSIS — E78.2 MIXED HYPERLIPIDEMIA: ICD-10-CM

## 2020-12-07 RX ORDER — LOSARTAN POTASSIUM 100 MG/1
50 TABLET ORAL DAILY
Status: CANCELLED | OUTPATIENT
Start: 2020-12-07

## 2020-12-07 RX ORDER — LOSARTAN POTASSIUM 50 MG/1
50 TABLET ORAL DAILY
Qty: 90 TABLET | Refills: 0 | Status: SHIPPED | OUTPATIENT
Start: 2020-12-07 | End: 2020-12-08

## 2020-12-07 RX ORDER — ATORVASTATIN CALCIUM 40 MG/1
40 TABLET, FILM COATED ORAL DAILY
Qty: 90 TABLET | Refills: 0 | Status: SHIPPED | OUTPATIENT
Start: 2020-12-07 | End: 2020-12-08 | Stop reason: SDUPTHER

## 2020-12-07 RX ORDER — ATORVASTATIN CALCIUM 80 MG/1
40 TABLET, FILM COATED ORAL DAILY
Status: CANCELLED | OUTPATIENT
Start: 2020-12-07

## 2020-12-07 NOTE — TELEPHONE ENCOUNTER
Please notify pt he requested a refill and I have sent it to mail order but he is due for an appt.     Also, I changed his pills to where is no longer has to break them in half. I changed losartan to a 50 mg tablet and his lipitor to a 40 mg tablet.  If he is not okay with that I will change it back.

## 2020-12-07 NOTE — TELEPHONE ENCOUNTER
----- Message from Yue Oscar sent at 12/7/2020  9:05 AM CST -----  Regarding: Advice  Contact: patient  Type:  RX Refill Request    Who Called:  patient  Refill or New Rx:  refill  RX Name and Strength:  losartan (COZAAR) 100 MG tablet and atorvastatin (LIPITOR) 80 MG tablet  How is the patient currently taking it? (ex. 1XDay):  1xday  Is this a 30 day or 90 day RX:  90    Preferred Pharmacy with phone number:    [facility] - [place], [place] - [address]  [address] [address]  Phone: [phone] Fax: [phone]    Local or Mail Order:  local  Ordering Provider:  garcia Genao Call Back Number:  [phone] (home)     Additional Information:  pt is asking if provider can fill his BP meds, they were initially being filled by provider in [place] but pt no longer sees that provider and he is almost out of meds. Please call to advise

## 2020-12-08 ENCOUNTER — OFFICE VISIT (OUTPATIENT)
Dept: FAMILY MEDICINE | Facility: CLINIC | Age: 75
End: 2020-12-08
Payer: MEDICARE

## 2020-12-08 VITALS
SYSTOLIC BLOOD PRESSURE: 136 MMHG | TEMPERATURE: 97 F | OXYGEN SATURATION: 99 % | BODY MASS INDEX: 26.66 KG/M2 | DIASTOLIC BLOOD PRESSURE: 84 MMHG | WEIGHT: 180 LBS | HEIGHT: 69 IN | RESPIRATION RATE: 14 BRPM | HEART RATE: 49 BPM

## 2020-12-08 DIAGNOSIS — E78.2 MIXED HYPERLIPIDEMIA: ICD-10-CM

## 2020-12-08 DIAGNOSIS — I10 ESSENTIAL HYPERTENSION: ICD-10-CM

## 2020-12-08 DIAGNOSIS — Z23 NEED FOR VACCINATION AGAINST STREPTOCOCCUS PNEUMONIAE: ICD-10-CM

## 2020-12-08 DIAGNOSIS — Z79.899 HIGH RISK MEDICATION USE: ICD-10-CM

## 2020-12-08 DIAGNOSIS — Z00.00 WELL ADULT EXAM: Primary | ICD-10-CM

## 2020-12-08 DIAGNOSIS — Z11.59 NEED FOR HEPATITIS C SCREENING TEST: ICD-10-CM

## 2020-12-08 PROCEDURE — 90732 PPSV23 VACC 2 YRS+ SUBQ/IM: CPT | Mod: S$GLB,,, | Performed by: NURSE PRACTITIONER

## 2020-12-08 PROCEDURE — G0009 PNEUMOCOCCAL POLYSACCHARIDE VACCINE 23-VALENT =>2YO SQ IM: ICD-10-PCS | Mod: S$GLB,,, | Performed by: NURSE PRACTITIONER

## 2020-12-08 PROCEDURE — 99214 PR OFFICE/OUTPT VISIT, EST, LEVL IV, 30-39 MIN: ICD-10-PCS | Mod: 25,S$GLB,, | Performed by: NURSE PRACTITIONER

## 2020-12-08 PROCEDURE — 90732 PNEUMOCOCCAL POLYSACCHARIDE VACCINE 23-VALENT =>2YO SQ IM: ICD-10-PCS | Mod: S$GLB,,, | Performed by: NURSE PRACTITIONER

## 2020-12-08 PROCEDURE — G0009 ADMIN PNEUMOCOCCAL VACCINE: HCPCS | Mod: S$GLB,,, | Performed by: NURSE PRACTITIONER

## 2020-12-08 PROCEDURE — 99214 OFFICE O/P EST MOD 30 MIN: CPT | Mod: 25,S$GLB,, | Performed by: NURSE PRACTITIONER

## 2020-12-08 RX ORDER — LOSARTAN POTASSIUM 100 MG/1
100 TABLET ORAL DAILY
Qty: 90 TABLET | Refills: 3 | Status: SHIPPED | OUTPATIENT
Start: 2020-12-08 | End: 2021-12-17

## 2020-12-08 RX ORDER — ATORVASTATIN CALCIUM 40 MG/1
40 TABLET, FILM COATED ORAL DAILY
Qty: 90 TABLET | Refills: 3 | Status: SHIPPED | OUTPATIENT
Start: 2020-12-08 | End: 2021-12-17

## 2020-12-08 RX ORDER — ASPIRIN 81 MG/1
81 TABLET ORAL DAILY
Qty: 90 TABLET | Refills: 3 | Status: SHIPPED | OUTPATIENT
Start: 2020-12-08 | End: 2024-03-06

## 2020-12-08 NOTE — PROGRESS NOTES
Subjective:       Patient ID: Norris Nelson is a 75 y.o. male.    Chief Complaint: No chief complaint on file.  -  Pt is 74 y/o male here for medication refill and states he has not had a doctor visit in almost a year. He is under the care of Dr. Ryan had a bilat carotid u/s in Feb with no significant carotid disease noted, Echo showed normal EF. He is also under the care of Dr. Tucker for prostate cancer, he states his PSA was <0.01.     C/o intermittent joint discomfort, endorses takes Aleve and it treats the pain well. He states he does not want any further treatment.     It was noted his BP was high today, he presented with home readings averaging 132-177/51-86.  Bilateral cerumen impaction thus irrigated by Edyta ZULETA with bilateral TM WNL.     Health maintenance discussed, states he has already received flu vaccine this year. Labs from March were reviewed.   -    Past Medical History:   Diagnosis Date    Carotid stenosis, left 2015    mild    Cyst of left kidney 2015    7.4 cm    Hyperlipidemia 2009    Hypertension 2009    Kidney stones     3 events    Kidney stones 05/08/2018    Prostate cancer 1995       Past Surgical History:   Procedure Laterality Date    COLONOSCOPY  2014    normal     CYST REMOVAL  1999    TESTICLE    PROSTATE SURGERY  1995    CANCER REMOVAL    TONSILLECTOMY  1950        Social History     Socioeconomic History    Marital status:      Spouse name: Not on file    Number of children: 3    Years of education: Not on file    Highest education level: Master's degree (e.g., MA, MS, Vanessa, MEd, MSW, NELDA)   Occupational History    Occupation: Retired-[employer] worker   Social Needs    Financial resource strain: Not on file    Food insecurity     Worry: Not on file     Inability: Not on file    Transportation needs     Medical: Not on file     Non-medical: Not on file   Tobacco Use    Smoking status: Former Smoker     Types: Cigarettes     Quit date: 1974      Years since quittin.9    Smokeless tobacco: Never Used   Substance and Sexual Activity    Alcohol use: Yes     Comment: Occasionally    Drug use: Never    Sexual activity: Yes     Partners: Female   Lifestyle    Physical activity     Days per week: Not on file     Minutes per session: Not on file    Stress: Not on file   Relationships    Social connections     Talks on phone: Not on file     Gets together: Not on file     Attends Congregational service: Not on file     Active member of club or organization: Not on file     Attends meetings of clubs or organizations: Not on file     Relationship status: Not on file   Other Topics Concern    Not on file   Social History Narrative    Not on file       Family History   Problem Relation Age of Onset    Stroke Mother     Early death Mother         41y/o smoker     Heart attack Father         69 y/o    Obesity Sister     Heart attack Brother     Pacemaker/defibrilator Brother        Review of patient's allergies indicates:  No Known Allergies       Current Outpatient Medications:     atorvastatin (LIPITOR) 40 MG tablet, Take 1 tablet (40 mg total) by mouth once daily., Disp: 90 tablet, Rfl: 3    aspirin (ECOTRIN) 81 MG EC tablet, Take 1 tablet (81 mg total) by mouth once daily., Disp: 90 tablet, Rfl: 3    losartan (COZAAR) 100 MG tablet, Take 1 tablet (100 mg total) by mouth once daily., Disp: 90 tablet, Rfl: 3    HPI  Review of Systems   Constitutional: Negative.    HENT: Negative.    Eyes: Negative.    Respiratory: Negative.    Cardiovascular: Negative.    Gastrointestinal: Negative.    Endocrine: Negative.    Genitourinary: Negative.    Musculoskeletal: Positive for arthralgias.   Skin: Negative.    Allergic/Immunologic: Negative.    Neurological: Negative.    Hematological: Negative.    Psychiatric/Behavioral: Negative.        Objective:      Physical Exam  Constitutional:       Appearance: Normal appearance.   HENT:      Head: Normocephalic.   Eyes:       Conjunctiva/sclera: Conjunctivae normal.   Neck:      Musculoskeletal: Normal range of motion.   Cardiovascular:      Rate and Rhythm: Normal rate and regular rhythm.      Pulses: Normal pulses.      Heart sounds: Normal heart sounds. No murmur.   Pulmonary:      Effort: Pulmonary effort is normal. No respiratory distress.      Breath sounds: Normal breath sounds. No wheezing.   Musculoskeletal: Normal range of motion.   Skin:     General: Skin is warm and dry.      Capillary Refill: Capillary refill takes less than 2 seconds.      Coloration: Skin is not jaundiced.   Neurological:      Mental Status: He is alert and oriented to person, place, and time.   Psychiatric:         Mood and Affect: Mood normal.         Behavior: Behavior normal.         Thought Content: Thought content normal.         Judgment: Judgment normal.         Assessment:       1. Well adult exam    2. Need for hepatitis C screening test    3. Essential hypertension, dx 2010    4. Mixed hyperlipidemia    5. High risk medication use    6. Need for vaccination against Streptococcus pneumoniae        Plan:       1- fasting labs  2- ppsv23 today  3- increase losartan to 100 mg  4- start ASA 81 mg  -  Well adult exam  -     Lipid Panel; Future; Expected date: 12/08/2020  -     CBC Auto Differential; Future; Expected date: 12/08/2020  -     Comprehensive Metabolic Panel; Future; Expected date: 12/08/2020  -     TSH; Future; Expected date: 12/08/2020  -     T4, Free; Future; Expected date: 12/08/2020  -     Vitamin D; Future; Expected date: 12/08/2020  -     Hepatitis C Antibody; Future; Expected date: 12/08/2020    Need for hepatitis C screening test  -     Hepatitis C Antibody; Future; Expected date: 12/08/2020    Essential hypertension, dx 2010  -     Lipid Panel; Future; Expected date: 12/08/2020  -     CBC Auto Differential; Future; Expected date: 12/08/2020  -     Comprehensive Metabolic Panel; Future; Expected date: 12/08/2020  -     TSH;  Future; Expected date: 12/08/2020  -     T4, Free; Future; Expected date: 12/08/2020  -     Vitamin D; Future; Expected date: 12/08/2020    Mixed hyperlipidemia  -     Lipid Panel; Future; Expected date: 12/08/2020  -     CBC Auto Differential; Future; Expected date: 12/08/2020  -     Comprehensive Metabolic Panel; Future; Expected date: 12/08/2020  -     TSH; Future; Expected date: 12/08/2020  -     T4, Free; Future; Expected date: 12/08/2020  -     Vitamin D; Future; Expected date: 12/08/2020  -     atorvastatin (LIPITOR) 40 MG tablet; Take 1 tablet (40 mg total) by mouth once daily.  Dispense: 90 tablet; Refill: 3    High risk medication use  -     Lipid Panel; Future; Expected date: 12/08/2020  -     CBC Auto Differential; Future; Expected date: 12/08/2020  -     Comprehensive Metabolic Panel; Future; Expected date: 12/08/2020  -     TSH; Future; Expected date: 12/08/2020  -     T4, Free; Future; Expected date: 12/08/2020  -     Vitamin D; Future; Expected date: 12/08/2020    Need for vaccination against Streptococcus pneumoniae  -     (In Office Administered) Pneumococcal Polysaccharide Vaccine (23 Valent) (SQ/IM)    Other orders  -     losartan (COZAAR) 100 MG tablet; Take 1 tablet (100 mg total) by mouth once daily.  Dispense: 90 tablet; Refill: 3  -     aspirin (ECOTRIN) 81 MG EC tablet; Take 1 tablet (81 mg total) by mouth once daily.  Dispense: 90 tablet; Refill: 3        Risks, benefits, and side effects were discussed with the patient. All questions were answered to the fullest satisfaction of the patient, and pt verbalized understanding and agreement to treatment plan. Pt was to call with any new or worsening symptoms, or present to the ER.    ..I have seen the patient, reviewed the nurse practitioner student assessment.  I have personally interviewed and examined the patient and agree with the findings.

## 2020-12-09 ENCOUNTER — LAB VISIT (OUTPATIENT)
Dept: LAB | Facility: HOSPITAL | Age: 75
End: 2020-12-09
Attending: NURSE PRACTITIONER
Payer: MEDICARE

## 2020-12-09 DIAGNOSIS — E78.2 MIXED HYPERLIPIDEMIA: ICD-10-CM

## 2020-12-09 DIAGNOSIS — Z11.59 NEED FOR HEPATITIS C SCREENING TEST: ICD-10-CM

## 2020-12-09 DIAGNOSIS — I10 ESSENTIAL HYPERTENSION: ICD-10-CM

## 2020-12-09 DIAGNOSIS — Z79.899 HIGH RISK MEDICATION USE: ICD-10-CM

## 2020-12-09 DIAGNOSIS — Z00.00 WELL ADULT EXAM: ICD-10-CM

## 2020-12-09 LAB
ALBUMIN SERPL BCP-MCNC: 3.9 G/DL (ref 3.5–5.2)
ALP SERPL-CCNC: 71 U/L (ref 55–135)
ALT SERPL W/O P-5'-P-CCNC: 21 U/L (ref 10–44)
ANION GAP SERPL CALC-SCNC: 5 MMOL/L (ref 8–16)
AST SERPL-CCNC: 24 U/L (ref 10–40)
BASOPHILS # BLD AUTO: 0.04 K/UL (ref 0–0.2)
BASOPHILS NFR BLD: 1 % (ref 0–1.9)
BILIRUB SERPL-MCNC: 0.9 MG/DL (ref 0.1–1)
BUN SERPL-MCNC: 18 MG/DL (ref 8–23)
CALCIUM SERPL-MCNC: 9.1 MG/DL (ref 8.7–10.5)
CHLORIDE SERPL-SCNC: 108 MMOL/L (ref 95–110)
CHOLEST SERPL-MCNC: 148 MG/DL (ref 120–199)
CHOLEST/HDLC SERPL: 2.1 {RATIO} (ref 2–5)
CO2 SERPL-SCNC: 27 MMOL/L (ref 23–29)
CREAT SERPL-MCNC: 1.2 MG/DL (ref 0.5–1.4)
DIFFERENTIAL METHOD: ABNORMAL
EOSINOPHIL # BLD AUTO: 0.2 K/UL (ref 0–0.5)
EOSINOPHIL NFR BLD: 5.1 % (ref 0–8)
ERYTHROCYTE [DISTWIDTH] IN BLOOD BY AUTOMATED COUNT: 12.5 % (ref 11.5–14.5)
EST. GFR  (AFRICAN AMERICAN): >60 ML/MIN/1.73 M^2
EST. GFR  (NON AFRICAN AMERICAN): 58 ML/MIN/1.73 M^2
GLUCOSE SERPL-MCNC: 92 MG/DL (ref 70–110)
HCT VFR BLD AUTO: 39.4 % (ref 40–54)
HDLC SERPL-MCNC: 71 MG/DL (ref 40–75)
HDLC SERPL: 48 % (ref 20–50)
HGB BLD-MCNC: 12.8 G/DL (ref 14–18)
IMM GRANULOCYTES # BLD AUTO: 0.01 K/UL (ref 0–0.04)
IMM GRANULOCYTES NFR BLD AUTO: 0.2 % (ref 0–0.5)
LDLC SERPL CALC-MCNC: 70 MG/DL (ref 63–159)
LYMPHOCYTES # BLD AUTO: 1.1 K/UL (ref 1–4.8)
LYMPHOCYTES NFR BLD: 25.4 % (ref 18–48)
MCH RBC QN AUTO: 31.4 PG (ref 27–31)
MCHC RBC AUTO-ENTMCNC: 32.5 G/DL (ref 32–36)
MCV RBC AUTO: 97 FL (ref 82–98)
MONOCYTES # BLD AUTO: 0.3 K/UL (ref 0.3–1)
MONOCYTES NFR BLD: 7.7 % (ref 4–15)
NEUTROPHILS # BLD AUTO: 2.5 K/UL (ref 1.8–7.7)
NEUTROPHILS NFR BLD: 60.6 % (ref 38–73)
NONHDLC SERPL-MCNC: 77 MG/DL
NRBC BLD-RTO: 0 /100 WBC
PLATELET # BLD AUTO: 157 K/UL (ref 150–350)
PMV BLD AUTO: 10.2 FL (ref 9.2–12.9)
POTASSIUM SERPL-SCNC: 4.3 MMOL/L (ref 3.5–5.1)
PROT SERPL-MCNC: 6.5 G/DL (ref 6–8.4)
RBC # BLD AUTO: 4.08 M/UL (ref 4.6–6.2)
SODIUM SERPL-SCNC: 140 MMOL/L (ref 136–145)
T4 FREE SERPL-MCNC: 0.84 NG/DL (ref 0.71–1.51)
TRIGL SERPL-MCNC: 35 MG/DL (ref 30–150)
TSH SERPL DL<=0.005 MIU/L-ACNC: 2.19 UIU/ML (ref 0.34–5.6)
WBC # BLD AUTO: 4.13 K/UL (ref 3.9–12.7)

## 2020-12-09 PROCEDURE — 36415 COLL VENOUS BLD VENIPUNCTURE: CPT

## 2020-12-09 PROCEDURE — 86803 HEPATITIS C AB TEST: CPT

## 2020-12-09 PROCEDURE — 84443 ASSAY THYROID STIM HORMONE: CPT

## 2020-12-09 PROCEDURE — 80053 COMPREHEN METABOLIC PANEL: CPT

## 2020-12-09 PROCEDURE — 82306 VITAMIN D 25 HYDROXY: CPT

## 2020-12-09 PROCEDURE — 80061 LIPID PANEL: CPT

## 2020-12-09 PROCEDURE — 85025 COMPLETE CBC W/AUTO DIFF WBC: CPT

## 2020-12-09 PROCEDURE — 84439 ASSAY OF FREE THYROXINE: CPT

## 2020-12-10 LAB — 25(OH)D3+25(OH)D2 SERPL-MCNC: 36 NG/ML (ref 30–96)

## 2020-12-10 RX ORDER — LOSARTAN POTASSIUM 100 MG/1
100 TABLET ORAL DAILY
Qty: 90 TABLET | Refills: 3 | OUTPATIENT
Start: 2020-12-10 | End: 2021-12-10

## 2020-12-11 LAB — HCV AB SERPL QL IA: NEGATIVE

## 2020-12-16 ENCOUNTER — PATIENT MESSAGE (OUTPATIENT)
Dept: FAMILY MEDICINE | Facility: CLINIC | Age: 75
End: 2020-12-16

## 2020-12-17 ENCOUNTER — TELEPHONE (OUTPATIENT)
Dept: FAMILY MEDICINE | Facility: CLINIC | Age: 75
End: 2020-12-17

## 2020-12-17 NOTE — TELEPHONE ENCOUNTER
----- Message from Ariadne Sandy sent at 12/17/2020 11:13 AM CST -----  Regarding: Pharmacy Auth  Contact: Deepali with [facility]  Type:  Pharmacy Calling to Clarify an RX    Name of Caller:  Deepali  Pharmacy Name:  [facility]  Prescription Name:  losartan (COZAAR) 100 MG tablet &losartan (COZAAR) 50 MG tablet  What do they need to clarify?:  What one is the patient currently taking.  Best Call Back Number:  [phone]  Fax number: [phone]  Additional Information:

## 2021-01-12 ENCOUNTER — IMMUNIZATION (OUTPATIENT)
Dept: FAMILY MEDICINE | Facility: CLINIC | Age: 76
End: 2021-01-12
Payer: MEDICARE

## 2021-01-12 DIAGNOSIS — Z23 NEED FOR VACCINATION: ICD-10-CM

## 2021-01-12 PROCEDURE — 0011A COVID-19, MRNA, LNP-S, PF, 100 MCG/0.5 ML DOSE VACCINE: ICD-10-PCS | Mod: ,,, | Performed by: FAMILY MEDICINE

## 2021-01-12 PROCEDURE — 91301 COVID-19, MRNA, LNP-S, PF, 100 MCG/0.5 ML DOSE VACCINE: CPT | Mod: ,,, | Performed by: FAMILY MEDICINE

## 2021-01-12 PROCEDURE — 91301 COVID-19, MRNA, LNP-S, PF, 100 MCG/0.5 ML DOSE VACCINE: ICD-10-PCS | Mod: ,,, | Performed by: FAMILY MEDICINE

## 2021-01-12 PROCEDURE — 0011A COVID-19, MRNA, LNP-S, PF, 100 MCG/0.5 ML DOSE VACCINE: CPT | Mod: ,,, | Performed by: FAMILY MEDICINE

## 2021-02-09 ENCOUNTER — IMMUNIZATION (OUTPATIENT)
Dept: FAMILY MEDICINE | Facility: CLINIC | Age: 76
End: 2021-02-09
Payer: MEDICARE

## 2021-02-09 DIAGNOSIS — Z23 NEED FOR VACCINATION: Primary | ICD-10-CM

## 2021-02-09 PROCEDURE — 91301 COVID-19, MRNA, LNP-S, PF, 100 MCG/0.5 ML DOSE VACCINE: ICD-10-PCS | Mod: S$GLB,,, | Performed by: FAMILY MEDICINE

## 2021-02-09 PROCEDURE — 0012A COVID-19, MRNA, LNP-S, PF, 100 MCG/0.5 ML DOSE VACCINE: ICD-10-PCS | Mod: CV19,S$GLB,, | Performed by: FAMILY MEDICINE

## 2021-02-09 PROCEDURE — 91301 COVID-19, MRNA, LNP-S, PF, 100 MCG/0.5 ML DOSE VACCINE: CPT | Mod: S$GLB,,, | Performed by: FAMILY MEDICINE

## 2021-02-09 PROCEDURE — 0012A COVID-19, MRNA, LNP-S, PF, 100 MCG/0.5 ML DOSE VACCINE: CPT | Mod: CV19,S$GLB,, | Performed by: FAMILY MEDICINE

## 2021-04-16 ENCOUNTER — OFFICE VISIT (OUTPATIENT)
Dept: FAMILY MEDICINE | Facility: CLINIC | Age: 76
End: 2021-04-16
Payer: MEDICARE

## 2021-04-16 VITALS
TEMPERATURE: 98 F | HEIGHT: 69 IN | BODY MASS INDEX: 28.14 KG/M2 | DIASTOLIC BLOOD PRESSURE: 84 MMHG | SYSTOLIC BLOOD PRESSURE: 110 MMHG | WEIGHT: 190 LBS | OXYGEN SATURATION: 99 % | RESPIRATION RATE: 16 BRPM | HEART RATE: 44 BPM

## 2021-04-16 DIAGNOSIS — L57.0 ACTINIC KERATOSIS OF SCALP: Primary | ICD-10-CM

## 2021-04-16 PROCEDURE — 99213 PR OFFICE/OUTPT VISIT, EST, LEVL III, 20-29 MIN: ICD-10-PCS | Mod: S$GLB,,, | Performed by: NURSE PRACTITIONER

## 2021-04-16 PROCEDURE — 99213 OFFICE O/P EST LOW 20 MIN: CPT | Mod: S$GLB,,, | Performed by: NURSE PRACTITIONER

## 2021-05-18 ENCOUNTER — PATIENT OUTREACH (OUTPATIENT)
Dept: ADMINISTRATIVE | Facility: OTHER | Age: 76
End: 2021-05-18

## 2021-05-19 ENCOUNTER — OFFICE VISIT (OUTPATIENT)
Dept: DERMATOLOGY | Facility: CLINIC | Age: 76
End: 2021-05-19
Payer: MEDICARE

## 2021-05-19 DIAGNOSIS — L82.1 SEBORRHEIC KERATOSES: ICD-10-CM

## 2021-05-19 DIAGNOSIS — L57.0 ACTINIC KERATOSES: Primary | ICD-10-CM

## 2021-05-19 DIAGNOSIS — L21.9 SEBORRHEIC DERMATITIS: ICD-10-CM

## 2021-05-19 DIAGNOSIS — L57.0 ACTINIC KERATOSIS OF SCALP: ICD-10-CM

## 2021-05-19 DIAGNOSIS — L81.4 SOLAR LENTIGO: ICD-10-CM

## 2021-05-19 DIAGNOSIS — D22.9 MULTIPLE BENIGN NEVI: ICD-10-CM

## 2021-05-19 DIAGNOSIS — L60.3 DYSTROPHIC NAIL: ICD-10-CM

## 2021-05-19 PROCEDURE — 99204 OFFICE O/P NEW MOD 45 MIN: CPT | Mod: 25,S$PBB,, | Performed by: DERMATOLOGY

## 2021-05-19 PROCEDURE — 17004 DESTROY PREMAL LESIONS 15/>: CPT | Mod: S$PBB,,, | Performed by: DERMATOLOGY

## 2021-05-19 PROCEDURE — 99999 PR PBB SHADOW E&M-EST. PATIENT-LVL III: CPT | Mod: PBBFAC,,, | Performed by: DERMATOLOGY

## 2021-05-19 PROCEDURE — 99204 PR OFFICE/OUTPT VISIT, NEW, LEVL IV, 45-59 MIN: ICD-10-PCS | Mod: 25,S$PBB,, | Performed by: DERMATOLOGY

## 2021-05-19 PROCEDURE — 87220 TISSUE EXAM FOR FUNGI: CPT | Mod: PBBFAC,PO | Performed by: DERMATOLOGY

## 2021-05-19 PROCEDURE — 17004 PR DESTRUCTION, PREMALIGNANT LESIONS; 15 OR MORE LESIONS: ICD-10-PCS | Mod: S$PBB,,, | Performed by: DERMATOLOGY

## 2021-05-19 PROCEDURE — 99999 PR PBB SHADOW E&M-EST. PATIENT-LVL III: ICD-10-PCS | Mod: PBBFAC,,, | Performed by: DERMATOLOGY

## 2021-05-19 PROCEDURE — 99213 OFFICE O/P EST LOW 20 MIN: CPT | Mod: PBBFAC,PO,25 | Performed by: DERMATOLOGY

## 2021-05-19 PROCEDURE — 17004 DESTROY PREMAL LESIONS 15/>: CPT | Mod: PBBFAC,PO | Performed by: DERMATOLOGY

## 2021-05-19 RX ORDER — KETOCONAZOLE 20 MG/ML
SHAMPOO, SUSPENSION TOPICAL
Qty: 120 ML | Refills: 5 | Status: SHIPPED | OUTPATIENT
Start: 2021-05-19 | End: 2021-10-15

## 2021-05-19 RX ORDER — HYDROCORTISONE 25 MG/G
CREAM TOPICAL
Qty: 28 G | Refills: 1 | Status: SHIPPED | OUTPATIENT
Start: 2021-05-19 | End: 2021-10-15

## 2021-05-19 RX ORDER — FLUCONAZOLE 150 MG/1
TABLET ORAL
Qty: 12 TABLET | Refills: 0 | Status: SHIPPED | OUTPATIENT
Start: 2021-05-19 | End: 2021-08-17 | Stop reason: ALTCHOICE

## 2021-05-19 RX ORDER — CICLOPIROX 80 MG/ML
SOLUTION TOPICAL
Qty: 6.6 ML | Refills: 2 | Status: SHIPPED | OUTPATIENT
Start: 2021-05-19 | End: 2021-10-15

## 2021-08-17 ENCOUNTER — OFFICE VISIT (OUTPATIENT)
Dept: DERMATOLOGY | Facility: CLINIC | Age: 76
End: 2021-08-17
Payer: MEDICARE

## 2021-08-17 VITALS — BODY MASS INDEX: 28.14 KG/M2 | HEIGHT: 69 IN | WEIGHT: 190 LBS

## 2021-08-17 DIAGNOSIS — Z12.83 SKIN CANCER SCREENING: ICD-10-CM

## 2021-08-17 DIAGNOSIS — D22.9 MULTIPLE BENIGN NEVI: ICD-10-CM

## 2021-08-17 DIAGNOSIS — L57.0 ACTINIC KERATOSES: Primary | ICD-10-CM

## 2021-08-17 DIAGNOSIS — L82.1 SEBORRHEIC KERATOSES: ICD-10-CM

## 2021-08-17 DIAGNOSIS — R23.8 LONGITUDINAL RIDGING OF NAIL: ICD-10-CM

## 2021-08-17 PROCEDURE — 99213 PR OFFICE/OUTPT VISIT, EST, LEVL III, 20-29 MIN: ICD-10-PCS | Mod: 25,S$PBB,, | Performed by: DERMATOLOGY

## 2021-08-17 PROCEDURE — 17003 DESTRUCT PREMALG LES 2-14: CPT | Mod: PBBFAC,PO | Performed by: DERMATOLOGY

## 2021-08-17 PROCEDURE — 99999 PR PBB SHADOW E&M-EST. PATIENT-LVL III: ICD-10-PCS | Mod: PBBFAC,,, | Performed by: DERMATOLOGY

## 2021-08-17 PROCEDURE — 17000 DESTRUCT PREMALG LESION: CPT | Mod: S$PBB,,, | Performed by: DERMATOLOGY

## 2021-08-17 PROCEDURE — 99213 OFFICE O/P EST LOW 20 MIN: CPT | Mod: PBBFAC,PO | Performed by: DERMATOLOGY

## 2021-08-17 PROCEDURE — 17003 DESTRUCT PREMALG LES 2-14: CPT | Mod: S$PBB,,, | Performed by: DERMATOLOGY

## 2021-08-17 PROCEDURE — 17000 PR DESTRUCTION(LASER SURGERY,CRYOSURGERY,CHEMOSURGERY),PREMALIGNANT LESIONS,FIRST LESION: ICD-10-PCS | Mod: S$PBB,,, | Performed by: DERMATOLOGY

## 2021-08-17 PROCEDURE — 99213 OFFICE O/P EST LOW 20 MIN: CPT | Mod: 25,S$PBB,, | Performed by: DERMATOLOGY

## 2021-08-17 PROCEDURE — 99999 PR PBB SHADOW E&M-EST. PATIENT-LVL III: CPT | Mod: PBBFAC,,, | Performed by: DERMATOLOGY

## 2021-08-17 PROCEDURE — 17000 DESTRUCT PREMALG LESION: CPT | Mod: PBBFAC,PO | Performed by: DERMATOLOGY

## 2021-08-17 PROCEDURE — 17003 DESTRUCTION, PREMALIGNANT LESIONS; SECOND THROUGH 14 LESIONS: ICD-10-PCS | Mod: S$PBB,,, | Performed by: DERMATOLOGY

## 2021-10-05 ENCOUNTER — TELEPHONE (OUTPATIENT)
Dept: FAMILY MEDICINE | Facility: CLINIC | Age: 76
End: 2021-10-05

## 2021-10-05 DIAGNOSIS — E78.2 MIXED HYPERLIPIDEMIA: ICD-10-CM

## 2021-10-15 ENCOUNTER — OFFICE VISIT (OUTPATIENT)
Dept: FAMILY MEDICINE | Facility: CLINIC | Age: 76
End: 2021-10-15
Payer: MEDICARE

## 2021-10-15 ENCOUNTER — TELEPHONE (OUTPATIENT)
Dept: FAMILY MEDICINE | Facility: CLINIC | Age: 76
End: 2021-10-15

## 2021-10-15 VITALS
BODY MASS INDEX: 28.14 KG/M2 | OXYGEN SATURATION: 97 % | HEIGHT: 69 IN | DIASTOLIC BLOOD PRESSURE: 70 MMHG | RESPIRATION RATE: 18 BRPM | HEART RATE: 47 BPM | WEIGHT: 190 LBS | SYSTOLIC BLOOD PRESSURE: 132 MMHG

## 2021-10-15 DIAGNOSIS — I10 ESSENTIAL HYPERTENSION: ICD-10-CM

## 2021-10-15 DIAGNOSIS — R00.1 BRADYCARDIA WITH 51-60 BEATS PER MINUTE: ICD-10-CM

## 2021-10-15 DIAGNOSIS — E78.2 MIXED HYPERLIPIDEMIA: ICD-10-CM

## 2021-10-15 DIAGNOSIS — Z79.899 HIGH RISK MEDICATION USE: ICD-10-CM

## 2021-10-15 DIAGNOSIS — Z00.00 WELL ADULT EXAM: Primary | ICD-10-CM

## 2021-10-15 DIAGNOSIS — Z85.46 H/O PROSTATE CANCER: ICD-10-CM

## 2021-10-15 DIAGNOSIS — I10 ESSENTIAL HYPERTENSION: Primary | ICD-10-CM

## 2021-10-15 PROCEDURE — 99213 OFFICE O/P EST LOW 20 MIN: CPT | Mod: PBBFAC | Performed by: NURSE PRACTITIONER

## 2021-10-15 PROCEDURE — 99213 OFFICE O/P EST LOW 20 MIN: CPT | Mod: S$PBB,,, | Performed by: NURSE PRACTITIONER

## 2021-10-15 PROCEDURE — 99213 PR OFFICE/OUTPT VISIT, EST, LEVL III, 20-29 MIN: ICD-10-PCS | Mod: S$PBB,,, | Performed by: NURSE PRACTITIONER

## 2021-10-15 PROCEDURE — 99999 PR PBB SHADOW E&M-EST. PATIENT-LVL III: ICD-10-PCS | Mod: PBBFAC,,, | Performed by: NURSE PRACTITIONER

## 2021-10-15 PROCEDURE — 99999 PR PBB SHADOW E&M-EST. PATIENT-LVL III: CPT | Mod: PBBFAC,,, | Performed by: NURSE PRACTITIONER

## 2021-11-01 ENCOUNTER — IMMUNIZATION (OUTPATIENT)
Dept: FAMILY MEDICINE | Facility: CLINIC | Age: 76
End: 2021-11-01
Payer: MEDICARE

## 2021-11-01 DIAGNOSIS — Z23 NEED FOR VACCINATION: Primary | ICD-10-CM

## 2021-11-01 PROCEDURE — 0004A COVID-19, MRNA, LNP-S, PF, 30 MCG/0.3 ML DOSE VACCINE: CPT | Mod: PBBFAC

## 2021-12-10 ENCOUNTER — LAB VISIT (OUTPATIENT)
Dept: LAB | Facility: HOSPITAL | Age: 76
End: 2021-12-10
Attending: NURSE PRACTITIONER
Payer: MEDICARE

## 2021-12-10 DIAGNOSIS — I10 ESSENTIAL HYPERTENSION: ICD-10-CM

## 2021-12-10 DIAGNOSIS — Z79.899 HIGH RISK MEDICATION USE: ICD-10-CM

## 2021-12-10 DIAGNOSIS — E78.2 MIXED HYPERLIPIDEMIA: ICD-10-CM

## 2021-12-10 DIAGNOSIS — Z00.00 WELL ADULT EXAM: ICD-10-CM

## 2021-12-10 DIAGNOSIS — Z85.46 H/O PROSTATE CANCER: ICD-10-CM

## 2021-12-10 LAB
ALBUMIN SERPL BCP-MCNC: 3.7 G/DL (ref 3.5–5.2)
ALP SERPL-CCNC: 79 U/L (ref 55–135)
ALT SERPL W/O P-5'-P-CCNC: 21 U/L (ref 10–44)
ANION GAP SERPL CALC-SCNC: 10 MMOL/L (ref 8–16)
AST SERPL-CCNC: 22 U/L (ref 10–40)
BASOPHILS # BLD AUTO: 0.03 K/UL (ref 0–0.2)
BASOPHILS NFR BLD: 0.7 % (ref 0–1.9)
BILIRUB SERPL-MCNC: 0.6 MG/DL (ref 0.1–1)
BUN SERPL-MCNC: 17 MG/DL (ref 8–23)
CALCIUM SERPL-MCNC: 9.3 MG/DL (ref 8.7–10.5)
CHLORIDE SERPL-SCNC: 110 MMOL/L (ref 95–110)
CHOLEST SERPL-MCNC: 141 MG/DL (ref 120–199)
CHOLEST/HDLC SERPL: 2.2 {RATIO} (ref 2–5)
CO2 SERPL-SCNC: 23 MMOL/L (ref 23–29)
COMPLEXED PSA SERPL-MCNC: <0.01 NG/ML (ref 0–4)
CREAT SERPL-MCNC: 1.3 MG/DL (ref 0.5–1.4)
DIFFERENTIAL METHOD: ABNORMAL
EOSINOPHIL # BLD AUTO: 0.3 K/UL (ref 0–0.5)
EOSINOPHIL NFR BLD: 7.7 % (ref 0–8)
ERYTHROCYTE [DISTWIDTH] IN BLOOD BY AUTOMATED COUNT: 12.4 % (ref 11.5–14.5)
EST. GFR  (AFRICAN AMERICAN): >60 ML/MIN/1.73 M^2
EST. GFR  (NON AFRICAN AMERICAN): 52.3 ML/MIN/1.73 M^2
GLUCOSE SERPL-MCNC: 95 MG/DL (ref 70–110)
HCT VFR BLD AUTO: 38.3 % (ref 40–54)
HDLC SERPL-MCNC: 64 MG/DL (ref 40–75)
HDLC SERPL: 45.4 % (ref 20–50)
HGB BLD-MCNC: 12.6 G/DL (ref 14–18)
IMM GRANULOCYTES # BLD AUTO: 0.01 K/UL (ref 0–0.04)
IMM GRANULOCYTES NFR BLD AUTO: 0.2 % (ref 0–0.5)
LDLC SERPL CALC-MCNC: 65.6 MG/DL (ref 63–159)
LYMPHOCYTES # BLD AUTO: 1.3 K/UL (ref 1–4.8)
LYMPHOCYTES NFR BLD: 29.3 % (ref 18–48)
MCH RBC QN AUTO: 31.3 PG (ref 27–31)
MCHC RBC AUTO-ENTMCNC: 32.9 G/DL (ref 32–36)
MCV RBC AUTO: 95 FL (ref 82–98)
MONOCYTES # BLD AUTO: 0.3 K/UL (ref 0.3–1)
MONOCYTES NFR BLD: 6.7 % (ref 4–15)
NEUTROPHILS # BLD AUTO: 2.4 K/UL (ref 1.8–7.7)
NEUTROPHILS NFR BLD: 55.4 % (ref 38–73)
NONHDLC SERPL-MCNC: 77 MG/DL
NRBC BLD-RTO: 0 /100 WBC
PLATELET # BLD AUTO: 147 K/UL (ref 150–450)
PMV BLD AUTO: 9.8 FL (ref 9.2–12.9)
POTASSIUM SERPL-SCNC: 4.1 MMOL/L (ref 3.5–5.1)
PROT SERPL-MCNC: 6.6 G/DL (ref 6–8.4)
RBC # BLD AUTO: 4.02 M/UL (ref 4.6–6.2)
SODIUM SERPL-SCNC: 143 MMOL/L (ref 136–145)
T4 FREE SERPL-MCNC: 1 NG/DL (ref 0.71–1.51)
TRIGL SERPL-MCNC: 57 MG/DL (ref 30–150)
TSH SERPL DL<=0.005 MIU/L-ACNC: 1.11 UIU/ML (ref 0.4–4)
WBC # BLD AUTO: 4.3 K/UL (ref 3.9–12.7)

## 2021-12-10 PROCEDURE — 84153 ASSAY OF PSA TOTAL: CPT | Performed by: NURSE PRACTITIONER

## 2021-12-10 PROCEDURE — 84439 ASSAY OF FREE THYROXINE: CPT | Performed by: NURSE PRACTITIONER

## 2021-12-10 PROCEDURE — 85025 COMPLETE CBC W/AUTO DIFF WBC: CPT | Performed by: NURSE PRACTITIONER

## 2021-12-10 PROCEDURE — 36415 COLL VENOUS BLD VENIPUNCTURE: CPT | Performed by: NURSE PRACTITIONER

## 2021-12-10 PROCEDURE — 80053 COMPREHEN METABOLIC PANEL: CPT | Performed by: NURSE PRACTITIONER

## 2021-12-10 PROCEDURE — 84443 ASSAY THYROID STIM HORMONE: CPT | Performed by: NURSE PRACTITIONER

## 2021-12-10 PROCEDURE — 80061 LIPID PANEL: CPT | Performed by: NURSE PRACTITIONER

## 2021-12-20 ENCOUNTER — PATIENT OUTREACH (OUTPATIENT)
Dept: ADMINISTRATIVE | Facility: OTHER | Age: 76
End: 2021-12-20
Payer: MEDICARE

## 2021-12-30 ENCOUNTER — TELEPHONE (OUTPATIENT)
Dept: FAMILY MEDICINE | Facility: CLINIC | Age: 76
End: 2021-12-30
Payer: MEDICARE

## 2022-01-11 DIAGNOSIS — E78.2 MIXED HYPERLIPIDEMIA: ICD-10-CM

## 2022-01-11 NOTE — TELEPHONE ENCOUNTER
Last OV: 10/15/2021    RX Name and Strength:  atorvastatin (LIPITOR) 40 MG tablet  losartan (COZAAR) 100 MG tablet     How is the patient currently taking it? (ex. 1XDay):  n/a  Is this a 30 day or 90 day RX:  90 day  Preferred Pharmacy with phone number:       [facility] - [place], [place] - [address]  [address] [address]  Phone: [phone] Fax: [phone]

## 2022-01-11 NOTE — TELEPHONE ENCOUNTER
----- Message from Reece Luz sent at 1/11/2022 10:06 AM CST -----  Contact: Self  Type:  RX Refill Request    Who Called:  Patient  Refill or New Rx:  Refill   RX Name and Strength:  atorvastatin (LIPITOR) 40 MG tablet  losartan (COZAAR) 100 MG tablet    How is the patient currently taking it? (ex. 1XDay):  n/a  Is this a 30 day or 90 day RX:  90 day  Preferred Pharmacy with phone number:      [facility] - [place], [place] - [address]  [address] [address]  Phone: [phone] Fax: [phone]        Local or Mail Order:  Mail Order  Ordering Provider:  Carolyne Genao Call Back Number:  [phone]  Additional Information:  PT states he needs these refilled as he is almost out. Please call pt back at [phone] to update and advise.

## 2022-01-15 RX ORDER — ATORVASTATIN CALCIUM 40 MG/1
TABLET, FILM COATED ORAL
Qty: 90 TABLET | Refills: 3 | Status: SHIPPED | OUTPATIENT
Start: 2022-01-15 | End: 2022-12-13 | Stop reason: SDUPTHER

## 2022-01-15 RX ORDER — LOSARTAN POTASSIUM 100 MG/1
TABLET ORAL
Qty: 90 TABLET | Refills: 3 | Status: SHIPPED | OUTPATIENT
Start: 2022-01-15 | End: 2022-12-13 | Stop reason: SDUPTHER

## 2022-02-17 ENCOUNTER — OFFICE VISIT (OUTPATIENT)
Dept: DERMATOLOGY | Facility: CLINIC | Age: 77
End: 2022-02-17
Payer: MEDICARE

## 2022-02-17 VITALS — WEIGHT: 190 LBS | HEIGHT: 69 IN | BODY MASS INDEX: 28.14 KG/M2

## 2022-02-17 DIAGNOSIS — D48.5 NEOPLASM OF UNCERTAIN BEHAVIOR OF SKIN: Primary | ICD-10-CM

## 2022-02-17 DIAGNOSIS — L57.0 ACTINIC KERATOSES: ICD-10-CM

## 2022-02-17 DIAGNOSIS — L82.1 SEBORRHEIC KERATOSES: ICD-10-CM

## 2022-02-17 DIAGNOSIS — D22.9 MULTIPLE BENIGN NEVI: ICD-10-CM

## 2022-02-17 DIAGNOSIS — L81.4 SOLAR LENTIGO: ICD-10-CM

## 2022-02-17 PROCEDURE — 17000 DESTRUCT PREMALG LESION: CPT | Mod: 59,PBBFAC,PO | Performed by: DERMATOLOGY

## 2022-02-17 PROCEDURE — 17003 DESTRUCT PREMALG LES 2-14: CPT | Mod: 59,S$PBB,, | Performed by: DERMATOLOGY

## 2022-02-17 PROCEDURE — 99213 PR OFFICE/OUTPT VISIT, EST, LEVL III, 20-29 MIN: ICD-10-PCS | Mod: 25,S$PBB,, | Performed by: DERMATOLOGY

## 2022-02-17 PROCEDURE — 99999 PR PBB SHADOW E&M-EST. PATIENT-LVL III: CPT | Mod: PBBFAC,,, | Performed by: DERMATOLOGY

## 2022-02-17 PROCEDURE — 88305 TISSUE EXAM BY PATHOLOGIST: ICD-10-PCS | Mod: 26,,, | Performed by: PATHOLOGY

## 2022-02-17 PROCEDURE — 88305 TISSUE EXAM BY PATHOLOGIST: CPT | Mod: 26,,, | Performed by: PATHOLOGY

## 2022-02-17 PROCEDURE — 17003 DESTRUCT PREMALG LES 2-14: CPT | Mod: 59,PBBFAC,PO | Performed by: DERMATOLOGY

## 2022-02-17 PROCEDURE — 99213 OFFICE O/P EST LOW 20 MIN: CPT | Mod: PBBFAC,PO,25 | Performed by: DERMATOLOGY

## 2022-02-17 PROCEDURE — 11102 TANGNTL BX SKIN SINGLE LES: CPT | Mod: S$PBB,,, | Performed by: DERMATOLOGY

## 2022-02-17 PROCEDURE — 17000 DESTRUCT PREMALG LESION: CPT | Mod: 59,S$PBB,, | Performed by: DERMATOLOGY

## 2022-02-17 PROCEDURE — 99999 PR PBB SHADOW E&M-EST. PATIENT-LVL III: ICD-10-PCS | Mod: PBBFAC,,, | Performed by: DERMATOLOGY

## 2022-02-17 PROCEDURE — 17000 PR DESTRUCTION(LASER SURGERY,CRYOSURGERY,CHEMOSURGERY),PREMALIGNANT LESIONS,FIRST LESION: ICD-10-PCS | Mod: 59,S$PBB,, | Performed by: DERMATOLOGY

## 2022-02-17 PROCEDURE — 88305 TISSUE EXAM BY PATHOLOGIST: CPT | Performed by: PATHOLOGY

## 2022-02-17 PROCEDURE — 17003 DESTRUCTION, PREMALIGNANT LESIONS; SECOND THROUGH 14 LESIONS: ICD-10-PCS | Mod: 59,S$PBB,, | Performed by: DERMATOLOGY

## 2022-02-17 PROCEDURE — 11102 PR TANGENTIAL BIOPSY, SKIN, SINGLE LESION: ICD-10-PCS | Mod: S$PBB,,, | Performed by: DERMATOLOGY

## 2022-02-17 PROCEDURE — 99213 OFFICE O/P EST LOW 20 MIN: CPT | Mod: 25,S$PBB,, | Performed by: DERMATOLOGY

## 2022-02-17 PROCEDURE — 11102 TANGNTL BX SKIN SINGLE LES: CPT | Mod: PBBFAC,PO | Performed by: DERMATOLOGY

## 2022-02-17 NOTE — PATIENT INSTRUCTIONS
Shave Biopsy Wound Care    Your doctor has performed a shave biopsy today.  A band aid and vaseline ointment has been placed over the site.  This should remain in place for 24 hours.  It is recommended that you keep the area dry for the first 24 hours.  After 24 hours, you may remove the band aid and wash the area with warm soap and water and apply Vaseline jelly.  Many patients prefer to use Neosporin or Bacitracin ointment.  This is acceptable; however, know that you can develop an allergy to this medication even if you have used it safely for years.  It is important to keep the area moist.  Letting it dry out and get air slows healing time, and will worsen the scar.  Band aid is optional after first 24 hours.      If you notice increasing redness, tenderness, pain, or yellow drainage at the biopsy site, please notify your doctor.  These are signs of an infection.    If your biopsy site is bleeding, apply firm pressure for 15 minutes straight.  Repeat for another 15 minutes, if it is still bleeding.   If the surgical site continues to bleed, then please contact your doctor.       [facility]  [address] [place] [address], [address] [id number]  Dept: [phone]       CRYOSURGERY      Your doctor has used a method called cryosurgery to treat your skin condition. Cryosurgery refers to the use of very cold substances to treat a variety of skin conditions such as warts, pre-skin cancers, molluscum contagiosum, sun spots, and several benign growths. The substance we use in cryosurgery is liquid nitrogen and is so cold (-195 degrees Celsius) that is burns when administered.     Following treatment in the office, the skin may immediately burn and become red. You may find the area around the lesion is affected as well. It is sometimes necessary to treat not only the lesion, but a small area of the surrounding normal skin to achieve a good response.     A blister, and even a blood  filled blister, may form after treatment.   This is a normal response. If the blister is painful, it is acceptable to sterilize a needle and with rubbing alcohol and gently pop the blister. It is important that you gently wash the area with soap and warm water as the blister fluid may contain wart virus if a wart was treated. Do no remove the roof of the blister.     The area treated can take anywhere from 1-3 weeks to heal. Healing time depends on the kind skin lesion treated, the location, and how aggressively the lesion was treated. It is recommended that the areas treated are covered with Vaseline or bacitracin ointment and a band-aid. If a band-aid is not practical, just ointment applied several times per day will do. Keeping these areas moist will speed the healing time.    Treatment with liquid nitrogen can leave a scar. In dark skin, it may be a light or dark scar, in light skin it may be a white or pink scar. These will generally fade with time, but may never go away completely.     If you have any concerns after your treatment, please feel free to call the office.         [facility] - [facility]  [address] [place] [id number]  Dept: [phone]

## 2022-02-17 NOTE — PROGRESS NOTES
Subjective:       Patient ID:  Norris Nelson is a 76 y.o. male who presents for   Chief Complaint   Patient presents with    Skin Check     UBSE     LOV: 8/17/21    Skin Check - UBSE    C/o spot on the back of the neck  Was tender, no treatment    Derm hx  Denies fhx nmsc/mm  Denies phx nmsc/mm  Previous bx, benign per pt. Suspicious moles.  Prev derm Dr Carpenter  ([email or web address])  Enjoys outdoor work, gardening  Previous outdoor employment ()   Wears hats, sun protection    Current Outpatient Medications:     aspirin (ECOTRIN) 81 MG EC tablet, Take 1 tablet (81 mg total) by mouth once daily., Disp: 90 tablet, Rfl: 3    atorvastatin (LIPITOR) 40 MG tablet, TAKE 1 TABLET ONE TIME DAILY, Disp: 90 tablet, Rfl: 3    losartan (COZAAR) 100 MG tablet, TAKE 1 TABLET ONE TIME DAILY, Disp: 90 tablet, Rfl: 3          Review of Systems   Constitutional: Negative for fever, chills and fatigue.   Skin: Positive for activity-related sunscreen use and wears hat. Negative for daily sunscreen use.   Hematologic/Lymphatic: Bruises/bleeds easily.        Objective:    Physical Exam   Constitutional: He appears well-developed and well-nourished. No distress.   Neurological: He is alert and oriented to person, place, and time. He is not disoriented.   Psychiatric: He has a normal mood and affect.   Skin:   Areas Examined (abnormalities noted in diagram):   Scalp / Hair Palpated and Inspected  Head / Face Inspection Performed  Neck Inspection Performed  Chest / Axilla Inspection Performed  Abdomen Inspection Performed  Back Inspection Performed  RUE Inspected  LUE Inspection Performed  Nails and Digits Inspection Performed                           Diagram Legend     Erythematous scaling macule/papule c/w actinic keratosis       Vascular papule c/w angioma      Pigmented verrucoid papule/plaque c/w seborrheic keratosis      Yellow umbilicated papule c/w sebaceous hyperplasia      Irregularly shaped tan macule  c/w lentigo     1-2 mm smooth white papules consistent with Milia      Movable subcutaneous cyst with punctum c/w epidermal inclusion cyst      Subcutaneous movable cyst c/w pilar cyst      Firm pink to brown papule c/w dermatofibroma      Pedunculated fleshy papule(s) c/w skin tag(s)      Evenly pigmented macule c/w junctional nevus     Mildly variegated pigmented, slightly irregular-bordered macule c/w mildly atypical nevus      Flesh colored to evenly pigmented papule c/w intradermal nevus       Pink pearly papule/plaque c/w basal cell carcinoma      Erythematous hyperkeratotic cursted plaque c/w SCC      Surgical scar with no sign of skin cancer recurrence      Open and closed comedones      Inflammatory papules and pustules      Verrucoid papule consistent consistent with wart     Erythematous eczematous patches and plaques     Dystrophic onycholytic nail with subungual debris c/w onychomycosis     Umbilicated papule    Erythematous-base heme-crusted tan verrucoid plaque consistent with inflamed seborrheic keratosis     Erythematous Silvery Scaling Plaque c/w Psoriasis     See annotation              Assessment / Plan:      Pathology Orders:     Normal Orders This Visit    Specimen to Pathology, Dermatology     Comments:    Number of Specimens:->1  ------------------------->-------------------------  Spec 1 Procedure:->Biopsy  Spec 1 Clinical Impression:->r/o scc  Spec 1 Source:->left posterior helix    Questions:    Procedure Type: Dermatology and skin neoplasms    Number of Specimens: 1    ------------------------: -------------------------    Spec 1 Procedure: Biopsy    Spec 1 Clinical Impression: r/o scc    Spec 1 Source: left posterior helix    Release to patient:         Neoplasm of uncertain behavior of skin  -     Specimen to Pathology, Dermatology  Shave biopsy procedure note:    Shave biopsy performed after verbal consent including risk of infection, scar, recurrence, need for additional treatment of  site. Area prepped with alcohol, anesthetized with approximately 1.0cc of 1% lidocaine with epinephrine. Lesional tissue shaved with razor blade. Hemostasis achieved with application of aluminum chloride followed by hyfrecation. No complications. Dressing applied. Wound care explained.    Actinic keratoses  Cryosurgery Procedure Note    Verbal consent from the patient is obtained and the patient is aware of the precancerous quality and need for treatment of these lesions. Liquid nitrogen cryosurgery is applied to the 5 actinic keratoses, as detailed in the physical exam, to produce a freeze injury. The patient is aware that blisters may form and is instructed on wound care with gentle cleansing and use of vaseline ointment to keep moist until healed. The patient is supplied a handout on cryosurgery and is instructed to call if lesions do not completely resolve.    Seborrheic keratoses  These are benign inherited growths without a malignant potential. Reassurance given to patient. No treatment is necessary.     Solar lentigo  This is a benign hyperpigmented sun induced lesion. Daily sun protection will reduce the number of new lesions. Treatment of these benign lesions are considered cosmetic.    Multiple benign nevi  Careful dermoscopy evaluation of nevi performed with none identified as needing biopsy today  Monitor for new mole or moles that are becoming bigger, darker, irritated, or developing irregular borders.     Patient instructed in importance in daily broad spectrum sun protection of at least spf 30. Mineral sunscreen ingredients preferred (Zinc +/- Titanium) and can be found OTC.   Recommend Elta MD for daily use on face and neck.  Patient encouraged to wear hat for all outdoor exposure.   Also discussed sun avoidance and use of protective clothing.               Follow up in about 6 months (around 8/17/2022), or if symptoms worsen or fail to improve.

## 2022-02-18 ENCOUNTER — PATIENT OUTREACH (OUTPATIENT)
Dept: ADMINISTRATIVE | Facility: OTHER | Age: 77
End: 2022-02-18
Payer: MEDICARE

## 2022-02-18 NOTE — PROGRESS NOTES
Chart was reviewed for overdue [facility]active [facility] Encounters ([facility])  topics  Updates were unable to be requested from care everywhere  Health Maintenance has been updated  [place] immunization registry triggered

## 2022-02-21 LAB
FINAL PATHOLOGIC DIAGNOSIS: NORMAL
GROSS: NORMAL
Lab: NORMAL
MICROSCOPIC EXAM: NORMAL

## 2022-02-21 NOTE — PROGRESS NOTES
[facility] Note    PCP: Michelle Barfield NP    Chief Complaint: establish care    SUBJECTIVE:       History of Present Illness:  Norris Nelson is a 76 y.o. male who presents to clinic to establish care. He is Established  to our clinic.     PSA on 12/10/21 <0.01    No issues with urination.   No gross hematuria or dysuria.     No changes in appetite.   No falnk pain.     11/17/20  Had a prostatectomy 25 years ago. Has yearly PSA checked. No adjuvant therapy.   No issues voiding. No hematuria or dysuria.   No issues with erections.   Hx of stones - stone episode in March, 4th episode, has passed them all.     CT in March showed some complex renal cysts. He has had these evaluated before with MRI in 2017 and CT in 2018 however nothing since. Was read as a Bosniak IIF cyst on right, all other simple cysts.     UA today: negative for blood, leuks, nitrite   AUASS: 2/1    Last urine culture: no documented UTIs  Last creatinine: 1.8 (3/17/20)    Family  hx: no  malignancy   Hx of HLD, HTN, carotid stenosis, prostate cancer    Past medical, family, and social history reviewed as documented in chart with pertinent positive medical, family, and social history detailed in HPI.    Review of patient's allergies indicates:  No Known Allergies    Past Medical History:   Diagnosis Date    Carotid stenosis, left 2015    mild    Cyst of left kidney 2015    7.4 cm    Hyperlipidemia 2009    Hypertension 2009    Kidney stones     3 events    Kidney stones 05/08/2018    Prostate cancer 1995     Past Surgical History:   Procedure Laterality Date    COLONOSCOPY  2014    normal     CYST REMOVAL  1999    TESTICLE    PROSTATE SURGERY  1995    CANCER REMOVAL    TONSILLECTOMY  1950     Family History   Problem Relation Age of Onset    Stroke Mother     Early death Mother         39y/o smoker     Heart attack Father         69 y/o    Obesity Sister     Heart attack Brother      "Pacemaker/defibrilator Brother     Melanoma Neg Hx      Social History     Tobacco Use    Smoking status: Former Smoker     Types: Cigarettes     Quit date:      Years since quittin.1    Smokeless tobacco: Never Used   Substance Use Topics    Alcohol use: Yes     Comment: Occasionally    Drug use: Never        Review of Systems   Constitutional: Negative for chills and fever.   HENT: Negative for trouble swallowing.    Eyes: Negative for pain.   Respiratory: Negative for cough and shortness of breath.    Cardiovascular: Negative for chest pain and palpitations.   Gastrointestinal: Negative for abdominal pain, nausea and vomiting.   Genitourinary: Negative for difficulty urinating, frequency, hematuria, nocturia, testicular pain and urgency.   Skin: Negative for rash.   Neurological: Negative for weakness.   Psychiatric/Behavioral: Negative for behavioral problems.       OBJECTIVE:     Anticoagulation:  no    Estimated body mass index is 28.26 kg/m² as calculated from the following:    Height as of this encounter: 5' 9" (1.753 m).    Weight as of this encounter: 86.8 kg (191 lb 6.4 oz).    Vital Signs (Most Recent)  Pulse: (!) 46 (22)  Resp: 18 (22)  BP: (!) 156/70 (22)    Physical Exam  Vitals and nursing note reviewed.   Constitutional:       General: He is not in acute distress.     Appearance: He is well-developed. He is not ill-appearing.   Eyes:      General: No scleral icterus.  Neck:      Trachea: Trachea normal. No tracheal deviation.   Cardiovascular:      Rate and Rhythm: Normal rate and regular rhythm.   Pulmonary:      Effort: Pulmonary effort is normal. No accessory muscle usage or respiratory distress.   Abdominal:      General: There is no distension.      Palpations: Abdomen is soft. There is no hepatomegaly or splenomegaly.      Tenderness: There is no abdominal tenderness.   Genitourinary:     Rectum: No mass. Normal anal tone.      Comments: Penis is " circumcised, there are no lesions, masses, plaques. Scrotum showed no rashes or lesions. Testicles and epididymes were symmetric and showed no masses or tenderness. Urethral meatus is orthotopic, patent and without lesions or discharge.  Musculoskeletal:         General: No tenderness.   Skin:     General: Skin is warm and dry.      Findings: No lesion or rash.   Neurological:      General: No focal deficit present.      Mental Status: He is alert and oriented to person, place, and time.   Psychiatric:         Mood and Affect: Mood normal.         Behavior: Behavior normal.         Thought Content: Thought content normal.         BMP  Lab Results   Component Value Date     12/10/2021    K 4.1 12/10/2021     12/10/2021    CO2 23 12/10/2021    BUN 17 12/10/2021    CREATININE 1.3 12/10/2021    CALCIUM 9.3 12/10/2021    ANIONGAP 10 12/10/2021    ESTGFRAFRICA >60.0 12/10/2021    EGFRNONAA 52.3 (A) 12/10/2021       Lab Results   Component Value Date    WBC 4.30 12/10/2021    HGB 12.6 (L) 12/10/2021    HCT 38.3 (L) 12/10/2021    MCV 95 12/10/2021     (L) 12/10/2021       Imaging:  CTRSS 3/17/20:  Kidneys are normal in size and attenuation.  There bilateral renal calculi measuring 3-5 mm in diameter.  There are numerous prominent bilateral simple and mildly complicated renal cysts some of which demonstrate minimal peripheral wall calcification.  The largest cyst measures 6.0 cm in diameter.  There is mild right-sided hydronephrosis with mild right hydroureter secondary to a partially obstructing 5 mm distal right ureteral calculus at the level of S2-S3.  There are mild proximal right peripelvic and periureteric inflammatory changes.  No left-sided changes of hydronephrosis.  The left ureter is normal in course and caliber.   The bladder is decompressed.  Surgical clips from previous prostatectomy.  No free fluid in the dependent pelvis.    ASSESSMENT     1. Prostate cancer    2. Cyst of kidney, acquired     3. Essential hypertension, dx 2010    4. Stenosis of left carotid artery, 2015    5. Mixed hyperlipidemia        PLAN:     - He is without complaints toady. Voiding well.   - Recent PSA is undetectable   - Repeat renal US now, will call him with results. If stable will repeat in 1 year. If changes noted will repeat CT scan  - Follow up in 1 year       Ashley Tucker MD

## 2022-02-22 ENCOUNTER — OFFICE VISIT (OUTPATIENT)
Dept: UROLOGY | Facility: CLINIC | Age: 77
End: 2022-02-22
Payer: MEDICARE

## 2022-02-22 VITALS
WEIGHT: 191.38 LBS | HEIGHT: 69 IN | SYSTOLIC BLOOD PRESSURE: 156 MMHG | DIASTOLIC BLOOD PRESSURE: 70 MMHG | HEART RATE: 46 BPM | BODY MASS INDEX: 28.35 KG/M2 | RESPIRATION RATE: 18 BRPM

## 2022-02-22 DIAGNOSIS — C61 PROSTATE CANCER: Primary | ICD-10-CM

## 2022-02-22 DIAGNOSIS — N28.1 CYST OF KIDNEY, ACQUIRED: ICD-10-CM

## 2022-02-22 DIAGNOSIS — I10 ESSENTIAL HYPERTENSION: ICD-10-CM

## 2022-02-22 DIAGNOSIS — E78.2 MIXED HYPERLIPIDEMIA: ICD-10-CM

## 2022-02-22 DIAGNOSIS — I65.22 STENOSIS OF LEFT CAROTID ARTERY: ICD-10-CM

## 2022-02-22 PROCEDURE — 99999 PR PBB SHADOW E&M-EST. PATIENT-LVL III: CPT | Mod: PBBFAC,,, | Performed by: STUDENT IN AN ORGANIZED HEALTH CARE EDUCATION/TRAINING PROGRAM

## 2022-02-22 PROCEDURE — 99999 PR PBB SHADOW E&M-EST. PATIENT-LVL III: ICD-10-PCS | Mod: PBBFAC,,, | Performed by: STUDENT IN AN ORGANIZED HEALTH CARE EDUCATION/TRAINING PROGRAM

## 2022-02-22 PROCEDURE — 99213 OFFICE O/P EST LOW 20 MIN: CPT | Mod: PBBFAC | Performed by: STUDENT IN AN ORGANIZED HEALTH CARE EDUCATION/TRAINING PROGRAM

## 2022-02-22 PROCEDURE — 99213 OFFICE O/P EST LOW 20 MIN: CPT | Mod: S$PBB,,, | Performed by: STUDENT IN AN ORGANIZED HEALTH CARE EDUCATION/TRAINING PROGRAM

## 2022-02-22 PROCEDURE — 99213 PR OFFICE/OUTPT VISIT, EST, LEVL III, 20-29 MIN: ICD-10-PCS | Mod: S$PBB,,, | Performed by: STUDENT IN AN ORGANIZED HEALTH CARE EDUCATION/TRAINING PROGRAM

## 2022-02-22 RX ORDER — CHOLECALCIFEROL (VITAMIN D3) 25 MCG
1000 TABLET ORAL DAILY
COMMUNITY

## 2022-02-22 RX ORDER — ZINC GLUCONATE 50 MG
50 TABLET ORAL DAILY
COMMUNITY

## 2022-03-14 ENCOUNTER — PROCEDURE VISIT (OUTPATIENT)
Dept: DERMATOLOGY | Facility: CLINIC | Age: 77
End: 2022-03-14
Payer: MEDICARE

## 2022-03-14 DIAGNOSIS — C44.229 SQUAMOUS CELL CARCINOMA, EAR, LEFT: Primary | ICD-10-CM

## 2022-03-14 PROCEDURE — 88342 IMHCHEM/IMCYTCHM 1ST ANTB: CPT | Mod: 26,,, | Performed by: PATHOLOGY

## 2022-03-14 PROCEDURE — 88305 TISSUE EXAM BY PATHOLOGIST: CPT | Performed by: PATHOLOGY

## 2022-03-14 PROCEDURE — 11642 PR EXC SKIN MALIG 1.1-2 CM FACE,FACIAL: ICD-10-PCS | Mod: S$PBB,,, | Performed by: DERMATOLOGY

## 2022-03-14 PROCEDURE — 88342 CHG IMMUNOCYTOCHEMISTRY: ICD-10-PCS | Mod: 26,,, | Performed by: PATHOLOGY

## 2022-03-14 PROCEDURE — 88342 IMHCHEM/IMCYTCHM 1ST ANTB: CPT | Performed by: PATHOLOGY

## 2022-03-14 PROCEDURE — 99499 UNLISTED E&M SERVICE: CPT | Mod: S$PBB,,, | Performed by: DERMATOLOGY

## 2022-03-14 PROCEDURE — 11642 EXC F/E/E/N/L MAL+MRG 1.1-2: CPT | Mod: S$PBB,,, | Performed by: DERMATOLOGY

## 2022-03-14 PROCEDURE — 11642 EXC F/E/E/N/L MAL+MRG 1.1-2: CPT | Mod: PBBFAC,PO | Performed by: DERMATOLOGY

## 2022-03-14 PROCEDURE — 88305 TISSUE EXAM BY PATHOLOGIST: CPT | Mod: 26,,, | Performed by: PATHOLOGY

## 2022-03-14 PROCEDURE — 88305 TISSUE EXAM BY PATHOLOGIST: ICD-10-PCS | Mod: 26,,, | Performed by: PATHOLOGY

## 2022-03-14 PROCEDURE — 99499 NO LOS: ICD-10-PCS | Mod: S$PBB,,, | Performed by: DERMATOLOGY

## 2022-03-14 PROCEDURE — 12051 INTMD RPR FACE/MM 2.5 CM/<: CPT | Mod: S$PBB,51,, | Performed by: DERMATOLOGY

## 2022-03-14 PROCEDURE — 12051 INTMD RPR FACE/MM 2.5 CM/<: CPT | Mod: PBBFAC,PO | Performed by: DERMATOLOGY

## 2022-03-14 PROCEDURE — 12051 PR INTERMED WOUND REPAIR FACE/EAR/EYELID/NOSE/LIP/MUC MEBR, 2.5CM OR LESS: ICD-10-PCS | Mod: S$PBB,51,, | Performed by: DERMATOLOGY

## 2022-03-14 NOTE — PATIENT INSTRUCTIONS
Surgery Wound Care    Your doctor has performed an excision today.  A bandage and vaseline ointment has been placed over the site.  This should remain in place for 24 hours.  It is recommended that you keep the area dry for the first 24 hours.  After 24 hours, you may remove the band aid and wash the area with warm soap and water and apply Vaseline jelly or aquaphore.  Many patients prefer to use Neosporin or Bacitracin ointment.  This is acceptable; however know that you can develop an allergy to this medication even if you have used it safely for years.  It is important to keep the area moist.  Letting it dry out and get air slows healing time, will worsen the scar, and make it more difficult to remove the stitches if they were placed.        If you notice increasing redness, tenderness, pain, or yellow drainage at the biopsy or surgical site, please notify your doctor.  These are signs of an infection.    If your biopsy/surgical site is bleeding, apply firm pressure for 15 minutes straight.  Repeat for another 15 minutes, if it is still bleeding.   If the surgical site continues to bleed, then please contact your doctor.     [facility]  [facility]  [address] [place] [address]  Dept: [phone]

## 2022-03-14 NOTE — PROGRESS NOTES
.    Subjective:       Patient ID:  Norris Nelson is a 77 y.o. male who presents for No chief complaint on file.    Pt here for definitive excision scc left ear.  Feeling well today.   Denies pacemaker, defibrillator or blood thinners.   No additional cutaneous complaints.       Current Outpatient Medications:     aspirin (ECOTRIN) 81 MG EC tablet, Take 1 tablet (81 mg total) by mouth once daily., Disp: 90 tablet, Rfl: 3    atorvastatin (LIPITOR) 40 MG tablet, TAKE 1 TABLET ONE TIME DAILY, Disp: 90 tablet, Rfl: 3    losartan (COZAAR) 100 MG tablet, TAKE 1 TABLET ONE TIME DAILY, Disp: 90 tablet, Rfl: 3    vitamin D (VITAMIN D3) 1000 units Tab, Take 1,000 Units by mouth once daily., Disp: , Rfl:     zinc gluconate 50 mg tablet, Take 50 mg by mouth once daily., Disp: , Rfl:         Review of Systems   Constitutional: Negative for fever and chills.        Objective:    Physical Exam   Constitutional: He appears well-developed and well-nourished. No distress.   Neurological: He is alert and oriented to person, place, and time. He is not disoriented.   Psychiatric: He has a normal mood and affect.   Skin:   Areas Examined (abnormalities noted in diagram):   Head / Face Inspection Performed              Diagram Legend     Erythematous scaling macule/papule c/w actinic keratosis       Vascular papule c/w angioma      Pigmented verrucoid papule/plaque c/w seborrheic keratosis      Yellow umbilicated papule c/w sebaceous hyperplasia      Irregularly shaped tan macule c/w lentigo     1-2 mm smooth white papules consistent with Milia      Movable subcutaneous cyst with punctum c/w epidermal inclusion cyst      Subcutaneous movable cyst c/w pilar cyst      Firm pink to brown papule c/w dermatofibroma      Pedunculated fleshy papule(s) c/w skin tag(s)      Evenly pigmented macule c/w junctional nevus     Mildly variegated pigmented, slightly irregular-bordered macule c/w mildly atypical nevus      Flesh colored to evenly  pigmented papule c/w intradermal nevus       Pink pearly papule/plaque c/w basal cell carcinoma      Erythematous hyperkeratotic cursted plaque c/w SCC      Surgical scar with no sign of skin cancer recurrence      Open and closed comedones      Inflammatory papules and pustules      Verrucoid papule consistent consistent with wart     Erythematous eczematous patches and plaques     Dystrophic onycholytic nail with subungual debris c/w onychomycosis     Umbilicated papule    Erythematous-base heme-crusted tan verrucoid plaque consistent with inflamed seborrheic keratosis     Erythematous Silvery Scaling Plaque c/w Psoriasis     See annotation      Assessment / Plan:      Pathology Orders:     Normal Orders This Visit    Specimen to Pathology, Dermatology     Questions:    Procedure Type: Dermatology and skin neoplasms    Number of Specimens: 1    ------------------------: -------------------------    Spec 1 Procedure: Excision >2cm    Spec 1 Clinical Impression: bx proven scc check margins    Spec 1 Source: left posterior helix    Release to patient:         Squamous cell carcinoma, ear, left  -     Specimen to Pathology, Dermatology    PROCEDURE: Elliptical excision with intermediate layered repair in order to decrease dead space and decrease tension.    ANESTHETIC: 3 cc 1% Xylocaine with Epinephrine 1:100,000, buffered    SURGEON: Krysta Cavanaugh MD  ASSISTANTS: Emily Barron RN, Felicia Mathis LPN,  [place]er, MA    PREOPERATIVE DIAGNOSIS:  Biopsy-proven Squamous Cell Carcinoma    POSTOPERATIVE DIAGNOSIS:  Same as preoperative diagnosis    PATHOLOGIC DIAGNOSIS: Pending    LOCATION: left post helix    INITIAL LESION SIZE: 0.8 cm    EXCISED DIAMETER: 1.2 cm    PREPARATION: The diagnosis, procedure, alternatives, benefits and risks, including but not limited to: infection, bleeding/bruising, drug reactions, pain, scar or cosmetic defect, local sensation disturbances, wound dehiscence (separation of wound edges  after sutures removed) and/or recurrence of present condition were explained to the patient. The patient elected to proceed.  Patient's identity was verified using 2 patient identifiers and the side and site was verified.  Time out period with surgeon, assistant and patient in surgical suite was taken.    PROCEDURE: The location noted above was prepped and draped in the usual sterile fashion. The area was anesthetized with intradermal buffered xylocaine. Lesional tissue was carefully marked with at least 2 mm margins of clinically normal skin in all directions. A fusiform elliptical excision was done with #15 blade carried down completely through the dermis into the subcutaneous tissues to the level of the subcutaneous fat, and dissection was carried out in that plane.  Electrocoagulation was used to obtain hemostasis. Blood loss was minimal. The wound was then approximated in a layered fashion with subcutaneous and intradermal sutures of 5.0 Monocryl, approximately 3 in number, and the wound was then superficially closed with simple interrupted sutures of 5.0 Prolene.    The patient tolerated the procedure well.    The area was cleaned and dressed appropriately and the patient was given wound care instructions, as well as an appointment for follow-up evaluation.    LENGTH OF REPAIR: 2.2 cm             Follow up in about 1 week (around 3/21/2022) for suture removal.

## 2022-03-21 ENCOUNTER — HOSPITAL ENCOUNTER (OUTPATIENT)
Dept: RADIOLOGY | Facility: HOSPITAL | Age: 77
Discharge: HOME | End: 2022-03-21
Attending: STUDENT IN AN ORGANIZED HEALTH CARE EDUCATION/TRAINING PROGRAM
Payer: MEDICARE

## 2022-03-21 ENCOUNTER — TELEPHONE (OUTPATIENT)
Dept: UROLOGY | Facility: CLINIC | Age: 77
End: 2022-03-21
Payer: MEDICARE

## 2022-03-21 DIAGNOSIS — N28.1 CYST OF KIDNEY, ACQUIRED: ICD-10-CM

## 2022-03-21 PROCEDURE — 76770 US EXAM ABDO BACK WALL COMP: CPT | Mod: 26,,, | Performed by: RADIOLOGY

## 2022-03-21 PROCEDURE — 76770 US RETROPERITONEAL COMPLETE: ICD-10-PCS | Mod: 26,,, | Performed by: RADIOLOGY

## 2022-03-21 PROCEDURE — 76770 US EXAM ABDO BACK WALL COMP: CPT | Mod: TC

## 2022-03-21 NOTE — TELEPHONE ENCOUNTER
----- Message from Ashley Tucker MD sent at 3/21/2022 12:58 PM CDT -----  Please let patient know I have reviewed his US. Cyst still appears to be a simple multiloculated cyst with no solid components. Has only grown slightly from 8.7 to 8.9 cm. I want to repeat a CT scan in 6 months.

## 2022-03-21 NOTE — TELEPHONE ENCOUNTER
Call placed, no answer, unable to leave message, says wireless customer is unavailable, try again later.

## 2022-03-23 LAB
FINAL PATHOLOGIC DIAGNOSIS: NORMAL
GROSS: NORMAL
Lab: NORMAL
MICROSCOPIC EXAM: NORMAL

## 2022-03-28 ENCOUNTER — CLINICAL SUPPORT (OUTPATIENT)
Dept: DERMATOLOGY | Facility: CLINIC | Age: 77
End: 2022-03-28
Payer: MEDICARE

## 2022-03-28 DIAGNOSIS — Z48.02 VISIT FOR SUTURE REMOVAL: Primary | ICD-10-CM

## 2022-03-28 PROCEDURE — 99211 OFF/OP EST MAY X REQ PHY/QHP: CPT | Mod: PBBFAC,PO

## 2022-03-28 PROCEDURE — 99024 POSTOP FOLLOW-UP VISIT: CPT | Mod: POP,,, | Performed by: DERMATOLOGY

## 2022-03-28 PROCEDURE — 99999 PR PBB SHADOW E&M-EST. PATIENT-LVL I: CPT | Mod: PBBFAC,,,

## 2022-03-28 PROCEDURE — 99024 PR POST-OP FOLLOW-UP VISIT: ICD-10-PCS | Mod: POP,,, | Performed by: DERMATOLOGY

## 2022-03-28 PROCEDURE — 99999 PR PBB SHADOW E&M-EST. PATIENT-LVL I: ICD-10-PCS | Mod: PBBFAC,,,

## 2022-03-30 NOTE — PROGRESS NOTES
We received the pathology results from your surgery. I am pleased to inform you that the skin cancer has been completely excised. No further treatment is needed. Please contact my office if you have any questions.   Thank you,  Dr Cavanaugh    Skin, left posterior helix, excision:   -SCAR (POST-SURGICAL)   -NEGATIVE FOR RESIDUAL SQUAMOUS CELL CARCINOMA IN-SITU

## 2022-04-30 ENCOUNTER — HOSPITAL ENCOUNTER (EMERGENCY)
Facility: HOSPITAL | Age: 77
Discharge: HOME | End: 2022-04-30
Attending: FAMILY MEDICINE
Payer: MEDICARE

## 2022-04-30 VITALS
BODY MASS INDEX: 25.92 KG/M2 | DIASTOLIC BLOOD PRESSURE: 76 MMHG | OXYGEN SATURATION: 96 % | RESPIRATION RATE: 20 BRPM | WEIGHT: 175 LBS | HEIGHT: 69 IN | TEMPERATURE: 98 F | HEART RATE: 58 BPM | SYSTOLIC BLOOD PRESSURE: 139 MMHG

## 2022-04-30 DIAGNOSIS — N20.0 KIDNEY STONE: Primary | ICD-10-CM

## 2022-04-30 LAB
BACTERIA #/AREA URNS HPF: ABNORMAL /HPF
BILIRUB UR QL STRIP: ABNORMAL
CLARITY UR: ABNORMAL
COLOR UR: YELLOW
GLUCOSE UR QL STRIP: NEGATIVE
HGB UR QL STRIP: NEGATIVE
HYALINE CASTS #/AREA URNS LPF: ABNORMAL /LPF
KETONES UR QL STRIP: ABNORMAL
LEUKOCYTE ESTERASE UR QL STRIP: NEGATIVE
MICROSCOPIC COMMENT: ABNORMAL
NITRITE UR QL STRIP: NEGATIVE
PH UR STRIP: 7 [PH] (ref 5–8)
PROT UR QL STRIP: ABNORMAL
RBC #/AREA URNS HPF: 5 /HPF (ref 0–4)
SP GR UR STRIP: 1.02 (ref 1–1.03)
SQUAMOUS #/AREA URNS HPF: ABNORMAL /HPF
URN SPEC COLLECT METH UR: ABNORMAL
UROBILINOGEN UR STRIP-ACNC: 1 EU/DL
WBC #/AREA URNS HPF: 2 /HPF (ref 0–5)

## 2022-04-30 PROCEDURE — 74176 CT RENAL STONE STUDY ABD PELVIS WO: ICD-10-PCS | Mod: 26,,, | Performed by: RADIOLOGY

## 2022-04-30 PROCEDURE — 74176 CT ABD & PELVIS W/O CONTRAST: CPT | Mod: TC

## 2022-04-30 PROCEDURE — 74176 CT ABD & PELVIS W/O CONTRAST: CPT | Mod: 26,,, | Performed by: RADIOLOGY

## 2022-04-30 PROCEDURE — 99284 EMERGENCY DEPT VISIT MOD MDM: CPT | Mod: 25

## 2022-04-30 PROCEDURE — 81000 URINALYSIS NONAUTO W/SCOPE: CPT | Performed by: NURSE PRACTITIONER

## 2022-04-30 RX ORDER — HYDROCODONE BITARTRATE AND ACETAMINOPHEN 10; 325 MG/1; MG/1
1 TABLET ORAL EVERY 8 HOURS PRN
Qty: 10 TABLET | Refills: 0 | Status: SHIPPED | OUTPATIENT
Start: 2022-04-30 | End: 2022-12-13

## 2022-04-30 RX ORDER — TAMSULOSIN HYDROCHLORIDE 0.4 MG/1
0.4 CAPSULE ORAL DAILY
Qty: 10 CAPSULE | Refills: 0 | Status: SHIPPED | OUTPATIENT
Start: 2022-04-30 | End: 2022-12-13

## 2022-04-30 RX ORDER — LEVOFLOXACIN 500 MG/1
500 TABLET, FILM COATED ORAL DAILY
Qty: 7 TABLET | Refills: 0 | Status: SHIPPED | OUTPATIENT
Start: 2022-04-30 | End: 2022-05-07

## 2022-04-30 NOTE — ED PROVIDER NOTES
Encounter Date: 2022       History     Chief Complaint   Patient presents with    Abdominal Pain     LLQ abdominal pain since this morning- hx of kidney stones    Nausea     Awakened this morning with abdominal and left flank pain. Has had multiple kidney stones in the past. Feels like he has a kidney stone. Took a hydrocodone for the pain. Took a pepto because his stomach was hurting. No diarrhea. Pain was 10/10 when he awakened, currently is 5/10        Review of patient's allergies indicates:  No Known Allergies  Past Medical History:   Diagnosis Date    Carotid stenosis, left     mild    Cyst of left kidney     7.4 cm    Hyperlipidemia     Hypertension     Kidney stones     3 events    Kidney stones 2018    Prostate cancer      Past Surgical History:   Procedure Laterality Date    COLONOSCOPY      normal     CYST REMOVAL      TESTICLE    PROSTATE SURGERY      CANCER REMOVAL    TONSILLECTOMY  1950     Family History   Problem Relation Age of Onset    Stroke Mother     Early death Mother         39y/o smoker     Heart attack Father         67 y/o    Obesity Sister     Heart attack Brother     Pacemaker/defibrilator Brother     Melanoma Neg Hx      Social History     Tobacco Use    Smoking status: Former Smoker     Types: Cigarettes     Quit date:      Years since quittin.3    Smokeless tobacco: Never Used   Substance Use Topics    Alcohol use: Yes     Comment: Occasionally    Drug use: Never     Review of Systems   Constitutional: Negative for fever.   HENT: Negative for sore throat.    Respiratory: Negative for shortness of breath.    Cardiovascular: Negative for chest pain.   Gastrointestinal: Positive for nausea.   Genitourinary: Positive for flank pain. Negative for dysuria.   Musculoskeletal: Negative for back pain.   Skin: Negative for rash.   Neurological: Negative for weakness.   Hematological: Does not bruise/bleed easily.        Physical Exam     Initial Vitals   BP Pulse Resp Temp SpO2   04/30/22 1038 04/30/22 1034 04/30/22 1034 04/30/22 1034 04/30/22 1034   139/76 (!) 58 20 98.2 °F (36.8 °C) 96 %      MAP       --                Physical Exam    Nursing note and vitals reviewed.  Constitutional: He appears well-developed and well-nourished.   HENT:   Head: Normocephalic and atraumatic.   Eyes: EOM are normal.   Neck: Neck supple.   Normal range of motion.  Cardiovascular: Normal rate and regular rhythm.   Pulmonary/Chest: Breath sounds normal. He has no wheezes. He has no rhonchi.   Abdominal: Abdomen is soft. There is no abdominal tenderness.   Musculoskeletal:         General: Normal range of motion.      Cervical back: Normal range of motion and neck supple.     Lymphadenopathy:     He has no cervical adenopathy.   Neurological: He is alert and oriented to person, place, and time.   Skin: Skin is warm and dry. Capillary refill takes less than 2 seconds.   Psychiatric: He has a normal mood and affect. Thought content normal.         ED Course   Procedures  Labs Reviewed   URINALYSIS, REFLEX TO URINE CULTURE - Abnormal; Notable for the following components:       Result Value    Appearance, UA Hazy (*)     Protein, UA 1+ (*)     Ketones, UA 1+ (*)     Bilirubin (UA) 1+ (*)     All other components within normal limits    Narrative:     Preferred Collection Type->Urine, Clean Catch  Specimen Source->Urine   URINALYSIS MICROSCOPIC - Abnormal; Notable for the following components:    RBC, UA 5 (*)     Bacteria Few (*)     Hyaline Casts, UA occasional (*)     All other components within normal limits    Narrative:     Preferred Collection Type->Urine, Clean Catch  Specimen Source->Urine          Imaging Results          CT Renal Stone Study ABD Pelvis WO (Final result)  Result time 04/30/22 13:05:10    Final result by Sunny Burgos MD (04/30/22 13:05:10)                 Impression:      1. Obstructive 0.5 cm stone within the distal left  ureter near the ureterovesicular junction with associated mild left-sided hydroureteronephrosis.  Mild left periureteral/perinephric fat stranding, likely reactive.  Correlate for possible UTI.  2. Bilateral nonobstructive nephrolithiasis.  3. Multiple bilateral simple-appearing and complex renal cysts.  4. Multiple hepatic hypodensities most compatible with cysts.  5. Stable bilateral lower lobe subcentimeter pulmonary nodules.  6. Diverticulosis coli without evidence of diverticulitis.      Electronically signed by: Sunny Burgos  Date:    04/30/2022  Time:    13:05             Narrative:    EXAMINATION:  CT RENAL STONE STUDY ABD PELVIS WO    CLINICAL HISTORY:  Flank pain, kidney stone suspected;    TECHNIQUE:  Low dose axial images, sagittal and coronal reformations were obtained from the lung bases to the pubic symphysis.  Contrast was not administered.    COMPARISON:  None    FINDINGS:  Heart: Normal in size. No pericardial effusion.    Lung Bases: Stable scattered subcentimeter calcified and noncalcified pulmonary nodules in the lower lobes.  Scattered bandlike densities at both lung bases suggestive of subsegmental atelectasis or scarring.  No pleural effusion.    Liver: Normal in size and attenuation.  Multiple scattered hepatic hypodensities are again demonstrated, some which are too small to characterize, with the largest 2 in the left hepatic lobe most compatible with cysts.  No definite new focal hepatic lesion.    Gallbladder: No calcified gallstones.    Bile Ducts: No evidence of dilated ducts.    Pancreas: Mild fatty atrophy.  No mass or peripancreatic fat stranding.    Spleen: Normal in size and attenuation.  Small accessory splenule.    Adrenals: Unremarkable.    Kidneys/ Ureters: Obstructive 0.5 cm stone within the distal left ureter near the ureterovesicular junction with associated mild left-sided hydronephrosis.  Mild left perinephric and periureteral fat stranding, likely reactive.   Nonobstructive 0.5 mm stones within the lower pole collecting systems of both kidneys.  Multiple bilateral simple and complex renal cysts are again demonstrated, without significant change in appearance as compared to the prior study.  This includes a multi-septated cyst with thin calcified internal septations within the right kidney.    Bladder: No evidence of wall thickening.    Reproductive organs: Prior prostatectomy.    GI Tract/Mesentery: Diverticulosis coli.  No evidence of bowel obstruction or inflammation.    Peritoneal Space: No ascites. No free air.    Retroperitoneum: No significant adenopathy.    Abdominal wall: Small fat-containing umbilical hernia.  Postsurgical changes related to prior inguinal hernia repair.    Vasculature: Calcific atherosclerosis of the abdominal aorta.  No aneurysm.    Bones: No acute osseous abnormality.  Multilevel degenerative change of the spine.  Mild grade 1 anterolisthesis of L5 on S1 with bilateral L5 spondylolysis.                                 Medications - No data to display              ED Course as of 04/30/22 1448   Sat Apr 30, 2022   1350 Discussed with Dr Freed. [NP]      ED Course User Index  [NP] TARA Muir         Discussed with Dr Freed. Will dispo home with follow up with urology. Return to ED for any worsening of symptoms. Increase fluid intake  Pt and wife verbalize understanding and agreement    Clinical Impression:   Final diagnoses:  [N20.0] Kidney stone (Primary)          ED Disposition Condition    Discharge Good        ED Prescriptions     Medication Sig Dispense Start Date End Date Auth. Provider    tamsulosin (FLOMAX) 0.4 mg Cap Take 1 capsule (0.4 mg total) by mouth once daily. 10 capsule 4/30/2022  TARA Muir    HYDROcodone-acetaminophen (NORCO)  mg per tablet Take 1 tablet by mouth every 8 (eight) hours as needed for Pain. 10 tablet 4/30/2022  TARA Muir    levoFLOXacin (LEVAQUIN) 500 MG tablet Take 1  tablet (500 mg total) by mouth once daily. for 7 days 7 tablet 4/30/2022 5/7/2022 TARA Muir        Follow-up Information    None          TARA Muir  04/30/22 3799

## 2022-04-30 NOTE — ED NOTES
Patient reports that he is unable to provide a urine sample at this time. Given call light and instructed to call when he can.

## 2022-05-02 DIAGNOSIS — N20.0 CALCULUS OF KIDNEY: Primary | ICD-10-CM

## 2022-12-13 ENCOUNTER — OFFICE VISIT (OUTPATIENT)
Dept: FAMILY MEDICINE | Facility: CLINIC | Age: 77
End: 2022-12-13
Payer: MEDICARE

## 2022-12-13 VITALS
HEART RATE: 66 BPM | BODY MASS INDEX: 26.9 KG/M2 | SYSTOLIC BLOOD PRESSURE: 138 MMHG | WEIGHT: 181.63 LBS | DIASTOLIC BLOOD PRESSURE: 84 MMHG | HEIGHT: 69 IN | RESPIRATION RATE: 18 BRPM

## 2022-12-13 DIAGNOSIS — Z85.46 H/O PROSTATE CANCER: ICD-10-CM

## 2022-12-13 DIAGNOSIS — Z00.00 WELL ADULT EXAM: Primary | ICD-10-CM

## 2022-12-13 DIAGNOSIS — Z12.5 PROSTATE CANCER SCREENING: ICD-10-CM

## 2022-12-13 DIAGNOSIS — I70.0 AORTIC ATHEROSCLEROSIS: ICD-10-CM

## 2022-12-13 DIAGNOSIS — I10 ESSENTIAL HYPERTENSION: ICD-10-CM

## 2022-12-13 DIAGNOSIS — E78.2 MIXED HYPERLIPIDEMIA: ICD-10-CM

## 2022-12-13 PROCEDURE — 99999 PR PBB SHADOW E&M-EST. PATIENT-LVL III: ICD-10-PCS | Mod: PBBFAC,,, | Performed by: NURSE PRACTITIONER

## 2022-12-13 PROCEDURE — 99999 PR PBB SHADOW E&M-EST. PATIENT-LVL III: CPT | Mod: PBBFAC,,, | Performed by: NURSE PRACTITIONER

## 2022-12-13 PROCEDURE — 99397 PR PREVENTIVE VISIT,EST,65 & OVER: ICD-10-PCS | Mod: S$PBB,GZ,, | Performed by: NURSE PRACTITIONER

## 2022-12-13 PROCEDURE — 99213 OFFICE O/P EST LOW 20 MIN: CPT | Mod: PBBFAC | Performed by: NURSE PRACTITIONER

## 2022-12-13 PROCEDURE — 99397 PER PM REEVAL EST PAT 65+ YR: CPT | Mod: S$PBB,GZ,, | Performed by: NURSE PRACTITIONER

## 2022-12-13 RX ORDER — LOSARTAN POTASSIUM 100 MG/1
TABLET ORAL
Qty: 90 TABLET | Refills: 3 | Status: SHIPPED | OUTPATIENT
Start: 2022-12-13 | End: 2023-12-11

## 2022-12-13 RX ORDER — ZINC GLUCONATE 50 MG
50 TABLET ORAL DAILY
Status: CANCELLED | OUTPATIENT
Start: 2022-12-13

## 2022-12-13 RX ORDER — ATORVASTATIN CALCIUM 40 MG/1
40 TABLET, FILM COATED ORAL NIGHTLY
Qty: 90 TABLET | Refills: 3 | Status: SHIPPED | OUTPATIENT
Start: 2022-12-13 | End: 2023-12-11

## 2022-12-13 RX ORDER — CHOLECALCIFEROL (VITAMIN D3) 25 MCG
1000 TABLET ORAL DAILY
Status: CANCELLED | OUTPATIENT
Start: 2022-12-13

## 2022-12-13 NOTE — PROGRESS NOTES
Subjective:       Patient ID: Norris Nelson is a 77 y.o. male.    Chief Complaint: Annual Exam, Hypertension, Hyperlipidemia, and Medication Refill  -  The patient is a 76 y/o male that presents for annual wellness and refills. Had shingles vac about 2 mo ago [facility] in [place]. Does not want flu vac and up to date on PPSV23.       HCC: Renal Stone CT 22 with Vasculature: Calcific atherosclerosis of the abdominal aorta.  No aneurysm taking 40 mg lipitor and last lipid panel 2021 WNL.  -  Past Medical History:   Diagnosis Date    Carotid stenosis, left     mild    Cyst of left kidney     7.4 cm    Hyperlipidemia     Hypertension     Kidney stones     3 events    Kidney stones 2018    Prostate cancer        Past Surgical History:   Procedure Laterality Date    COLONOSCOPY      normal     CYST REMOVAL      TESTICLE    PROSTATE SURGERY      CANCER REMOVAL    TONSILLECTOMY  1950        Social History     Socioeconomic History    Marital status:     Number of children: 3    Highest education level: Master's degree (e.g., MA, MS, Vanessa, MEd, MSW, NELDA)   Occupational History    Occupation: Retired-[employer] [place] worker   Tobacco Use    Smoking status: Former     Types: Cigarettes     Quit date:      Years since quittin.9    Smokeless tobacco: Never   Substance and Sexual Activity    Alcohol use: Yes     Comment: Occasionally    Drug use: Never    Sexual activity: Yes     Partners: Female       Family History   Problem Relation Age of Onset    Stroke Mother     Early death Mother         41y/o smoker     Heart attack Father         67 y/o    Obesity Sister     Heart attack Brother     Pacemaker/defibrilator Brother     Melanoma Neg Hx        Review of patient's allergies indicates:  No Known Allergies       Current Outpatient Medications:     aspirin (ECOTRIN) 81 MG EC tablet, Take 1 tablet (81 mg total) by mouth once daily., Disp: 90 tablet, Rfl: 3    vitamin D  (VITAMIN D3) 1000 units Tab, Take 1,000 Units by mouth once daily., Disp: , Rfl:     zinc gluconate 50 mg tablet, Take 50 mg by mouth once daily., Disp: , Rfl:     atorvastatin (LIPITOR) 40 MG tablet, Take 1 tablet (40 mg total) by mouth every evening. TAKE 1 TABLET ONE TIME DAILY, Disp: 90 tablet, Rfl: 3    losartan (COZAAR) 100 MG tablet, TAKE 1 TABLET ONE TIME DAILY, Disp: 90 tablet, Rfl: 3    Hypertension    Hyperlipidemia    Medication Refill    Review of Systems   Constitutional: Negative.    HENT: Negative.     Eyes: Negative.    Respiratory: Negative.     Cardiovascular: Negative.    Gastrointestinal: Negative.    Endocrine: Negative.    Genitourinary: Negative.    Musculoskeletal: Negative.    Skin: Negative.    Allergic/Immunologic: Negative.    Neurological: Negative.    Hematological: Negative.    Psychiatric/Behavioral: Negative.       Objective:      Physical Exam  Vitals and nursing note reviewed.   Constitutional:       General: He is not in acute distress.     Appearance: Normal appearance. He is well-developed and normal weight. He is not ill-appearing.   HENT:      Head: Normocephalic.   Eyes:      Conjunctiva/sclera: Conjunctivae normal.   Neck:      Thyroid: No thyromegaly.   Cardiovascular:      Rate and Rhythm: Normal rate and regular rhythm.      Heart sounds: Normal heart sounds. No murmur heard.  Pulmonary:      Effort: Pulmonary effort is normal.      Breath sounds: Normal breath sounds. No wheezing or rales.   Musculoskeletal:         General: Normal range of motion.      Cervical back: Normal range of motion.      Right lower leg: No edema.      Left lower leg: No edema.   Skin:     General: Skin is warm and dry.   Neurological:      Mental Status: He is alert and oriented to person, place, and time. Mental status is at baseline.   Psychiatric:         Mood and Affect: Mood normal.         Behavior: Behavior normal.         Thought Content: Thought content normal.         Judgment:  Judgment normal.       Assessment:       1. Well adult exam    2. Mixed hyperlipidemia    3. Essential hypertension    4. Aortic atherosclerosis    5. H/O prostate cancer    6. Prostate cancer screening          Plan:     1- fasting labs  2- all meds sent for one year  3- RTC 6 mo for routine follow up    Well adult exam  -     Lipid Panel; Future; Expected date: 12/13/2022  -     CBC Auto Differential; Future; Expected date: 12/13/2022  -     Comprehensive Metabolic Panel; Future; Expected date: 12/13/2022  -     PSA, Screening; Future; Expected date: 12/13/2022    Mixed hyperlipidemia  -     atorvastatin (LIPITOR) 40 MG tablet; Take 1 tablet (40 mg total) by mouth every evening. TAKE 1 TABLET ONE TIME DAILY  Dispense: 90 tablet; Refill: 3  -     Lipid Panel; Future; Expected date: 12/13/2022  -     CBC Auto Differential; Future; Expected date: 12/13/2022  -     Comprehensive Metabolic Panel; Future; Expected date: 12/13/2022  -     PSA, Screening; Future; Expected date: 12/13/2022    Essential hypertension  -     losartan (COZAAR) 100 MG tablet; TAKE 1 TABLET ONE TIME DAILY  Dispense: 90 tablet; Refill: 3  -     Lipid Panel; Future; Expected date: 12/13/2022  -     CBC Auto Differential; Future; Expected date: 12/13/2022  -     Comprehensive Metabolic Panel; Future; Expected date: 12/13/2022  -     PSA, Screening; Future; Expected date: 12/13/2022    Aortic atherosclerosis    H/O prostate cancer  -     PSA, Screening; Future; Expected date: 12/13/2022    Prostate cancer screening  -     PSA, Screening; Future; Expected date: 12/13/2022        Risks, benefits, and side effects were discussed with the patient. All questions were answered to the fullest satisfaction of the patient, and pt verbalized understanding and agreement to treatment plan. Pt was to call with any new or worsening symptoms, or present to the ER.

## 2022-12-16 ENCOUNTER — OFFICE VISIT (OUTPATIENT)
Dept: CARDIOLOGY | Facility: CLINIC | Age: 77
End: 2022-12-16
Payer: MEDICARE

## 2022-12-16 VITALS
SYSTOLIC BLOOD PRESSURE: 148 MMHG | RESPIRATION RATE: 20 BRPM | DIASTOLIC BLOOD PRESSURE: 76 MMHG | BODY MASS INDEX: 26.81 KG/M2 | HEIGHT: 69 IN | OXYGEN SATURATION: 98 % | HEART RATE: 53 BPM | WEIGHT: 181 LBS

## 2022-12-16 DIAGNOSIS — D64.9 ANEMIA, UNSPECIFIED TYPE: ICD-10-CM

## 2022-12-16 DIAGNOSIS — E65 ABDOMINAL OBESITY: ICD-10-CM

## 2022-12-16 DIAGNOSIS — N18.31 STAGE 3A CHRONIC KIDNEY DISEASE: ICD-10-CM

## 2022-12-16 DIAGNOSIS — R00.1 BRADYCARDIA: ICD-10-CM

## 2022-12-16 DIAGNOSIS — Z91.89 AT RISK FOR CARDIOVASCULAR EVENT: Primary | ICD-10-CM

## 2022-12-16 DIAGNOSIS — R09.89 BRUIT OF LEFT CAROTID ARTERY: ICD-10-CM

## 2022-12-16 DIAGNOSIS — I10 ESSENTIAL HYPERTENSION: ICD-10-CM

## 2022-12-16 PROCEDURE — 93010 EKG 12-LEAD: ICD-10-PCS | Mod: S$PBB,,, | Performed by: INTERNAL MEDICINE

## 2022-12-16 PROCEDURE — 99205 PR OFFICE/OUTPT VISIT, NEW, LEVL V, 60-74 MIN: ICD-10-PCS | Mod: S$PBB,25,, | Performed by: INTERNAL MEDICINE

## 2022-12-16 PROCEDURE — 93005 ELECTROCARDIOGRAM TRACING: CPT | Mod: PBBFAC,RHCUB | Performed by: INTERNAL MEDICINE

## 2022-12-16 PROCEDURE — 99999 PR PBB SHADOW E&M-EST. PATIENT-LVL IV: ICD-10-PCS | Mod: PBBFAC,,, | Performed by: INTERNAL MEDICINE

## 2022-12-16 PROCEDURE — 93010 ELECTROCARDIOGRAM REPORT: CPT | Mod: S$PBB,,, | Performed by: INTERNAL MEDICINE

## 2022-12-16 PROCEDURE — 99214 OFFICE O/P EST MOD 30 MIN: CPT | Mod: PBBFAC | Performed by: INTERNAL MEDICINE

## 2022-12-16 PROCEDURE — 99999 PR PBB SHADOW E&M-EST. PATIENT-LVL IV: CPT | Mod: PBBFAC,,, | Performed by: INTERNAL MEDICINE

## 2022-12-16 PROCEDURE — 99205 OFFICE O/P NEW HI 60 MIN: CPT | Mod: S$PBB,25,, | Performed by: INTERNAL MEDICINE

## 2022-12-16 NOTE — PROGRESS NOTES
Patient ID:  Norris Nelson is a 77 y.o. male who presents to Annual Exam  For cardiac follow up, HTN, HLD, mild Carotid stenosis, wife insist on follow up.  PCP and referred by Michelle Barfield NP  Prior cardiologist, see yearly, Dr. Ji in [place]  Lives with wife, Soledad, non-smoker  Retired  of water and sewage treatment    Patient is consider a new patient to me. Last seen 10/2019, advised annual follow up!    Health literacy: Medium  Activities: ADL's, do yard work, walks the beach, 3 miles, twice a week, 60 minutes. No problem, not limited  Nicotine: Quit smoking in 1974, 1 pack/day x 10+ years  Alcohol: Socially, 1-2 beer/cocktail per week, max 1 in any 24 hours.  Illicit drugs: Denies   Cardiac symptoms: None at present  Home BP: do not check  Medication compliance: Yes  Diet: regular  Caffeine: 1 cups/day, 1 coke/day  Labs:   Lab Results   Component Value Date    TSH 1.114 12/10/2021      No results found for: LABA1C, HGBA1C    Lab Results   Component Value Date    WBC 4.30 12/10/2021    HGB 12.6 (L) 12/10/2021    HCT 38.3 (L) 12/10/2021    MCV 95 12/10/2021     (L) 12/10/2021       CMP  Sodium   Date Value Ref Range Status   12/10/2021 143 136 - 145 mmol/L Final     Potassium   Date Value Ref Range Status   12/10/2021 4.1 3.5 - 5.1 mmol/L Final     Chloride   Date Value Ref Range Status   12/10/2021 110 95 - 110 mmol/L Final     CO2   Date Value Ref Range Status   12/10/2021 23 23 - 29 mmol/L Final     Glucose   Date Value Ref Range Status   12/10/2021 95 70 - 110 mg/dL Final     BUN   Date Value Ref Range Status   12/10/2021 17 8 - 23 mg/dL Final     Creatinine   Date Value Ref Range Status   12/10/2021 1.3 0.5 - 1.4 mg/dL Final     Calcium   Date Value Ref Range Status   12/10/2021 9.3 8.7 - 10.5 mg/dL Final     Total Protein   Date Value Ref Range Status   12/10/2021 6.6 6.0 - 8.4 g/dL Final     Albumin   Date Value Ref Range Status   12/10/2021 3.7 3.5 -  5.2 g/dL Final     Total Bilirubin   Date Value Ref Range Status   12/10/2021 0.6 0.1 - 1.0 mg/dL Final     Comment:     For infants and newborns, interpretation of results should be based  on gestational age, weight and in agreement with clinical  observations.    Premature Infant recommended reference ranges:  Up to 24 hours.............<8.0 mg/dL  Up to 48 hours............<12.0 mg/dL  3-5 days..................<15.0 mg/dL  6-29 days.................<15.0 mg/dL       Alkaline Phosphatase   Date Value Ref Range Status   12/10/2021 79 55 - 135 U/L Final     AST   Date Value Ref Range Status   12/10/2021 22 10 - 40 U/L Final     ALT   Date Value Ref Range Status   12/10/2021 21 10 - 44 U/L Final     Anion Gap   Date Value Ref Range Status   12/10/2021 10 8 - 16 mmol/L Final     eGFR if    Date Value Ref Range Status   12/10/2021 >60.0 >60 mL/min/1.73 m^2 Final     eGFR if non    Date Value Ref Range Status   12/10/2021 52.3 (A) >60 mL/min/1.73 m^2 Final     Comment:     Calculation used to obtain the estimated glomerular filtration  rate (eGFR) is the CKD-EPI equation.        @labrcntip(troponini)@  No results found for: BNP}   Lab Results   Component Value Date    CHOL 141 12/10/2021    CHOL 148 12/09/2020    CHOL 137 02/28/2020     Lab Results   Component Value Date    HDL 64 12/10/2021    HDL 71 12/09/2020    HDL 65 02/28/2020     Lab Results   Component Value Date    LDLCALC 65.6 12/10/2021    LDLCALC 70.0 12/09/2020    LDLCALC 62.6 (L) 02/28/2020     Lab Results   Component Value Date    TRIG 57 12/10/2021    TRIG 35 12/09/2020    TRIG 47 02/28/2020     Lab Results   Component Value Date    CHOLHDL 45.4 12/10/2021    CHOLHDL 48.0 12/09/2020    CHOLHDL 47.4 02/28/2020   UA at time of renal stone in 4/2022 - positive for protein.    Last Echo: 2/2020, SE  Last stress test: 2/2020  Cardiovascular angiogram: None  ECG: SB with SA, rate 46   Fundoscopic exam: 2017, no retinopathy  "detected    In 10/2019:  WM here for annual follow up, no specific heart condition but had premature family history for CAD and stoke, mother in her 40s and father  at age 68. No heart worries and no cardiac symptoms.     Ms. Michelle A. BRITTANY Barfield noted "Appt schedule with Dr. Ryan- no specific complaints encouraged to discuss left carotid stenosis mild noted in ."     HPI comments: in 2022, return at wife's request. No heart worries. At very high ASCVD 10-year event risk of almost 40%    Stress Echo 2020 - The patient's exercise capacity was excellent. functional capacity of 12 METS  The patient reached the end of the protocol.  During stress, the following significant arrhythmias were observed: occasional PVCs.  Eccentric left ventricular hypertrophy.  Mild left ventricular enlargement.  Normal left ventricular systolic function. The estimated ejection fraction is 65%.  Normal LV diastolic function.  Normal right ventricular systolic function.  Mild right atrial enlargement.  The ECG portion of this study is negative for myocardial ischemia.  The stress echo portion of this study is negative for myocardial ischemia.  No wall motion abnormalities.  Mild left atrial enlargement.    Carotid US - No significant Carotid disease seen.  Vertebral arteries shows antegrade flow.    Michelle Barfield NP noted 2022 - "Renal Stone CT 22 with Vasculature: Calcific atherosclerosis of the abdominal aorta.  No aneurysm taking 40 mg lipitor and last lipid panel 2021 WNL."    Review of Systems   Constitutional: Negative. Negative for diaphoresis, fever, malaise/fatigue, night sweats and weight gain.   HENT:  Positive for hearing loss (Mederate hearing loss AU) and tinnitus. Negative for nosebleeds.    Eyes:  Negative for visual disturbance.        Wears readers   Cardiovascular: Negative.  Negative for chest pain, claudication, cyanosis, dyspnea on exertion, irregular heartbeat, leg " "swelling, near-syncope, orthopnea, palpitations and paroxysmal nocturnal dyspnea.   Respiratory:  Positive for sputum production. Negative for cough, shortness of breath, sleep disturbances due to breathing, snoring and wheezing.         [place] = 8, today a 4, awaken refreshed.   Endocrine: Negative.  Negative for polydipsia and polyuria.   Hematologic/Lymphatic: Negative.  Does not bruise/bleed easily.   Skin:  Negative for flushing, nail changes, poor wound healing and suspicious lesions. Color change: When bumps into something, states skin is very thin and easily tears.  Musculoskeletal:  Positive for arthritis (Knees) and back pain (Lower back with no radiation). Negative for falls, gout, joint swelling, muscle weakness and myalgias. Joint pain: Knees. Muscle cramps: occassional Lower ext.  Gastrointestinal:  Positive for constipation, flatus and heartburn. Negative for hematemesis, hematochezia, melena and nausea.   Genitourinary: Negative.    Neurological: Negative.  Negative for disturbances in coordination, excessive daytime sleepiness, dizziness, focal weakness, headaches, light-headedness, loss of balance, numbness, vertigo and weakness.   Psychiatric/Behavioral: Negative.  Negative for depression and substance abuse. The patient does not have insomnia and is not nervous/anxious.    Allergic/Immunologic: Negative.       Objective:    Physical Exam  Constitutional:       Appearance: He is well-developed.      Comments: RA O2 sat 98%   HENT:      Head: Normocephalic.   Eyes:      Conjunctiva/sclera: Conjunctivae normal.      Pupils: Pupils are equal, round, and reactive to light.   Neck:      Thyroid: No thyromegaly.      Vascular: No JVD.      Comments: Circumference 15"  Cardiovascular:      Rate and Rhythm: Regular rhythm. Bradycardia present.      Pulses: Intact distal pulses.           Carotid pulses are 1+ on the right side and 1+ on the left side with bruit.       Radial pulses are 1+ on the right " "side and 1+ on the left side.        Dorsalis pedis pulses are 1+ on the right side and 1+ on the left side.        Posterior tibial pulses are 1+ on the right side and 1+ on the left side.      Heart sounds: Heart sounds are distant. No murmur heard.    No friction rub. No gallop.   Pulmonary:      Effort: Pulmonary effort is normal.      Breath sounds: Normal breath sounds. No rales.   Chest:      Chest wall: No tenderness.   Abdominal:      General: Bowel sounds are normal.      Palpations: Abdomen is soft.      Tenderness: There is no abdominal tenderness.      Comments: Waist 41.75" today 42", hip 40"   Musculoskeletal:         General: Normal range of motion.      Cervical back: Normal range of motion and neck supple.   Lymphadenopathy:      Cervical: No cervical adenopathy.   Skin:     General: Skin is warm and dry.      Findings: No rash.   Neurological:      Mental Status: He is alert and oriented to person, place, and time.         Assessment:       1. At risk for cardiovascular event    2. Essential hypertension, dx 2010    3. Anemia, unspecified type    4. Stage 3a chronic kidney disease    5. Bradycardia    6. Abdominal obesity    7. Bruit of left carotid artery         Plan:         At risk for cardiovascular event  -     IN OFFICE EKG 12-LEAD (to Muse)  -     Stress Echo; Future    Essential hypertension, dx 2010  -     Stress Echo; Future  -     Microalbumin/Creatinine Ratio, Urine; Future  -     Hypertension [facility] ([facility]) Enrollment Order  -     Hypertension [facility] ([facility]): Assign Onboarding Questionnaires    Anemia, unspecified type  -     Reticulocytes; Future; Expected date: 12/16/2022  -     Ferritin; Future; Expected date: 12/16/2022  -     Iron and TIBC; Future; Expected date: 12/16/2022    Stage 3a chronic kidney disease  -     Stress Echo; Future  -     Microalbumin/Creatinine Ratio, Urine; Future    Bradycardia  -     Stress Echo; Future  -     Holter monitor - 48 hour; " Future    Abdominal obesity    Bruit of left carotid artery  -     Stress Echo; Future    - All medical issues reviewed, continue current Rx. Aspirin is now consider optional for primary prevention of CV disease.   - Warning signs of MI and stroke given, if symptoms last more than 5 minutes, stop immediately and call 911, then chew 2-4 low-dose ASA (81 mg).   - Need annual fundoscopic examination.  - CV status andall medications reviewed, patient acknowledge good understanding.  - Recommend healthy living: moderate alcohol, healthy diet and regular exercise aiming for fitness, restorative sleep and weight control  - Discussed healthy daily limit of 1 oz of pure alcohol in any 24 hours (roughly 2 12-oz beers, 8 oz of wine (8%-12% alcohol), or 2.5 oz of liquor (80 proof)), can not save up.   - Instruction for Mediterranean diet and heart healthy exercise given.  - Check home blood pressure, 2 days weekly, do 2 readings within 5 minutes in AM and PM, keep log for review. Target resting BP is less than 130/85.   - Have to do some abdominal exercise to rid belly fat  - Highly recommend 30-60 minutes of exercise / activities daily, can have Sunday off, with 2-3 sessions of muscle strengthening weekly. A  would be very helpful.  - Recommend at least annual cardiovascular evaluation in view of patient's significant risk factors.  - Phone review / encourage use of [facility].  - Will send note to to PC provider for review.     Greater than 50% was spent in counseling and coordination of care. The above assessment and plan have been discussed at length. Referring provider's note reviewed. Labs and procedure over the last 6 months reviewed. Problem List updated. Asked to bring in all active medications / pills bottles with next visit.

## 2022-12-16 NOTE — PATIENT INSTRUCTIONS
Recommended Mediterranean dietEating Heart-Healthy Food: Using the DASH Plan  Eating for your heart doesnt have to be hard or boring. You just need to know how to make healthier choices. The DASH eating plan has been developed to help you do just that. DASH stands for Dietary Approaches to Stop Hypertension. It is a plan that has been proven to be healthier for your heart and to lower your risk for high blood pressure. It can also help lower your risk for cancer, heart disease, osteoporosis, and diabetes.  Choosing from Each Food Group  Choose foods from each of the food groups below each day. Try to get the recommended number of servings for each food group. The serving numbers are based on a diet of 2,000 calories a day. Talk to your doctor if youre unsure about your calorie needs.  Grains   Servings: 7-8 a day  A serving is:  1 slice bread  1 ounce dry cereal  half a cup cooked rice or pasta  Best choices: Whole grains and any grains high in fiber.  Vegetables   Servings: 4-5 a day  A serving is:  1 cup raw leafy vegetable  Half a cup cooked vegetable  Three-quarter cup vegetable juice  Best choices: Fresh or frozen vegetable prepared without too much added salt or fat.    Fruits   Servings: 4-5 a day  A serving is:  Three-quarter cup fruit juice  1 medium fruit  One-quarter cup dried fruit  One-half cup fresh, frozen, or canned fruit  Best choices: A variety of fresh fruits of different colors. Whole fruits are a much better choice than fruit juices.  Low-fat or Fat Free Dairy   Servings: 2-3 a day  A serving is:  8 ounces milk  1 cup yogurt  One and a half ounces cheese  Best choices: Skim or 1% milk, low-fat or fat free yogurt or buttermilk, and low-fat cheeses.       Meat, Poultry, Fish   Servings: 2 or fewer a day  A serving is:  3 ounces cooked meat, poultry, or fish  Best choices: Lean meats and fish. Trim away visible fat. Broil, roast, or boil instead of frying. Remove skin from poultry before eating.   Nuts, Seeds, Beans   Servings: 4-5 a week  A serving is:  One third cup nuts (or one and a half ounces)  2 tablespoons sunflower seeds  Half a cup cooked beans  Best choices: Dry roasted nuts with no salt added, lentils, kidney beans, garbanzo beans, and whole laguerre beans.    Fats and Oils   Servings: 2 a day  A serving is:  1 teaspoon vegetable oil  1 teaspoon soft margarine  1 tablespoon low-fat mayonnaise  1 teaspoon regular mayonnaise  2 tablespoons light salad dressing  1 tablespoon regular salad dressing  Best choices: Monounsaturated and polyunsaturated fats such as olive, canola, or safflower oil.  Sweets   Servings: 5 a week or fewer  A serving is:  1 tablespoon sugar, maple syrup, or honey  1 tablespoon jam or jelly  1 half-ounce jelly beans (about 15)  8 ounces lemonade  Best choices: Dried fruit can be a satisfying sweet. Choose low-fat sweets when possible. And watch your serving sizes!       Aerobic Exercise for a Healthy Heart  Exercise is a lot more than an energy booster and a stress reliever. It also strengthens your heart muscle, lowers your blood pressure and blood cholesterol, and burns calories.      Remember, some activity is better than none.     Choose an Aerobic Activity  Choose a nonstop activity that makes your heart and lungs work harder than they do when you rest or walk normally. This aerobic exercise can improve the way your heart and other muscles use oxygen. Make it fun by exercising with a friend and choosing an activity you enjoy. Here are some ideas:  Walking  Swimming  Bicycling  Stair climbing  Dancing  Jogging  Exercise Regularly  If you havent been exercising regularly,  get your doctors okay first. Then start slowly.  Here are some tips:  Begin exercising 3 times a week for 5-10 minutes at a time.  When you feel comfortable, add a few minutes each week.  Slowly build up to exercising 3-4 times each week for 20-40 minutes. Aim for a total of 150 or more minutes a  week.  Be sure to carry your nitroglycerin with you when you exercise.  If you get angina when youre exercising, stop what youre doing, take your nitroglycerin, and call your doctor.  © 2129-4142 [employer], [address] [place] [address], [place] [address]. All rights reserved. This information is not intended as a substitute for professional medical care. Always follow your healthcare professional's instructions.

## 2022-12-19 ENCOUNTER — HOSPITAL ENCOUNTER (OUTPATIENT)
Dept: CARDIOLOGY | Facility: HOSPITAL | Age: 77
Discharge: HOME | End: 2022-12-19
Attending: INTERNAL MEDICINE
Payer: MEDICARE

## 2022-12-19 ENCOUNTER — PATIENT OUTREACH (OUTPATIENT)
Dept: ADMINISTRATIVE | Facility: HOSPITAL | Age: 77
End: 2022-12-19
Payer: MEDICARE

## 2022-12-19 DIAGNOSIS — R00.1 BRADYCARDIA: ICD-10-CM

## 2022-12-19 PROCEDURE — 93227 HOLTER MONITOR - 48 HOUR (CUPID ONLY): ICD-10-PCS | Mod: ,,, | Performed by: INTERNAL MEDICINE

## 2022-12-19 PROCEDURE — 93227 XTRNL ECG REC<48 HR R&I: CPT | Mod: ,,, | Performed by: INTERNAL MEDICINE

## 2022-12-19 PROCEDURE — 93226 XTRNL ECG REC<48 HR SCAN A/R: CPT

## 2022-12-19 NOTE — PROGRESS NOTES
Health Maintenance Due   Topic Date Due    Shingles Vaccine (1 of 2) Never done    TETANUS VACCINE  08/24/2011    Pneumococcal Vaccines (Age 65+) (2 - PCV) 12/08/2021    COVID-19 Vaccine (4 - Booster for Moderna series) 12/27/2021

## 2022-12-30 ENCOUNTER — TELEPHONE (OUTPATIENT)
Dept: CARDIOLOGY | Facility: CLINIC | Age: 77
End: 2022-12-30
Payer: MEDICARE

## 2023-01-03 PROBLEM — I47.29 NSVT (NONSUSTAINED VENTRICULAR TACHYCARDIA): Status: ACTIVE | Noted: 2023-01-03

## 2023-01-03 LAB
OHS CV EVENT MONITOR DAY: 0
OHS CV HOLTER LENGTH DECIMAL HOURS: 47.98
OHS CV HOLTER LENGTH HOURS: 47
OHS CV HOLTER LENGTH MINUTES: 59
OHS CV HOLTER SINUS AVERAGE HR: 48
OHS CV HOLTER SINUS MAX HR: 83
OHS CV HOLTER SINUS MIN HR: 38

## 2023-01-11 ENCOUNTER — HOSPITAL ENCOUNTER (OUTPATIENT)
Dept: CARDIOLOGY | Facility: HOSPITAL | Age: 78
Discharge: HOME | End: 2023-01-11
Attending: INTERNAL MEDICINE
Payer: MEDICARE

## 2023-01-11 VITALS
SYSTOLIC BLOOD PRESSURE: 171 MMHG | DIASTOLIC BLOOD PRESSURE: 74 MMHG | WEIGHT: 181 LBS | HEIGHT: 69 IN | BODY MASS INDEX: 26.81 KG/M2 | HEART RATE: 57 BPM

## 2023-01-11 DIAGNOSIS — Z91.89 AT RISK FOR CARDIOVASCULAR EVENT: ICD-10-CM

## 2023-01-11 DIAGNOSIS — I10 ESSENTIAL HYPERTENSION: ICD-10-CM

## 2023-01-11 DIAGNOSIS — R09.89 BRUIT OF LEFT CAROTID ARTERY: ICD-10-CM

## 2023-01-11 DIAGNOSIS — R00.1 BRADYCARDIA: ICD-10-CM

## 2023-01-11 DIAGNOSIS — N18.31 STAGE 3A CHRONIC KIDNEY DISEASE: ICD-10-CM

## 2023-01-11 LAB
AORTIC ROOT ANNULUS: 3.57 CM
AORTIC VALVE CUSP SEPERATION: 2.38 CM
ASCENDING AORTA: 3.07 CM
BSA FOR ECHO PROCEDURE: 2 M2
CV ECHO LV RWT: 0.54 CM
CV STRESS BASE HR: 59 BPM
DIASTOLIC BLOOD PRESSURE: 95 MMHG
DOP CALC LVOT AREA: 3.9 CM2
DOP CALC LVOT DIAMETER: 2.24 CM
ECHO LV POSTERIOR WALL: 1.27 CM (ref 0.6–1.1)
EJECTION FRACTION: 70 %
FRACTIONAL SHORTENING: 39 % (ref 28–44)
INTERVENTRICULAR SEPTUM: 1.26 CM (ref 0.6–1.1)
LA MAJOR: 4.57 CM
LA MINOR: 2.91 CM
LEFT ATRIUM SIZE: 4.37 CM
LEFT INTERNAL DIMENSION IN SYSTOLE: 2.89 CM (ref 2.1–4)
LEFT VENTRICLE DIASTOLIC VOLUME INDEX: 52.52 ML/M2
LEFT VENTRICLE DIASTOLIC VOLUME: 103.98 ML
LEFT VENTRICLE MASS INDEX: 117 G/M2
LEFT VENTRICLE SYSTOLIC VOLUME INDEX: 16.1 ML/M2
LEFT VENTRICLE SYSTOLIC VOLUME: 31.92 ML
LEFT VENTRICULAR INTERNAL DIMENSION IN DIASTOLE: 4.73 CM (ref 3.5–6)
LEFT VENTRICULAR MASS: 230.93 G
OHS CV CPX 1 MINUTE RECOVERY HEART RATE: 106 BPM
OHS CV CPX 85 PERCENT MAX PREDICTED HEART RATE MALE: 120
OHS CV CPX ESTIMATED METS: 10
OHS CV CPX MAX PREDICTED HEART RATE: 141
OHS CV CPX PATIENT IS FEMALE: 0
OHS CV CPX PATIENT IS MALE: 1
OHS CV CPX PEAK DIASTOLIC BLOOD PRESSURE: 70 MMHG
OHS CV CPX PEAK HEAR RATE: 139 BPM
OHS CV CPX PEAK RATE PRESSURE PRODUCT: ABNORMAL
OHS CV CPX PEAK SYSTOLIC BLOOD PRESSURE: 205 MMHG
OHS CV CPX PERCENT MAX PREDICTED HEART RATE ACHIEVED: 99
OHS CV CPX RATE PRESSURE PRODUCT PRESENTING: 8968
RA MAJOR: 5.23 CM
RA WIDTH: 3.38 CM
RIGHT VENTRICULAR END-DIASTOLIC DIMENSION: 3.02 CM
RIGHT VENTRICULAR LENGTH IN DIASTOLE (APICAL 4-CHAMBER VIEW): 5.31 CM
RV MID DIAMA: 19.38 CM
STJ: 3.02 CM
STRESS ECHO POST EXERCISE DUR MIN: 6 MINUTES
STRESS ECHO POST EXERCISE DUR SEC: 34 SECONDS
SYSTOLIC BLOOD PRESSURE: 152 MMHG

## 2023-01-11 PROCEDURE — 93351 STRESS TTE COMPLETE: CPT

## 2023-01-11 PROCEDURE — 93351 STRESS ECHO (CUPID ONLY): ICD-10-PCS | Mod: 26,,, | Performed by: INTERNAL MEDICINE

## 2023-01-11 PROCEDURE — 93351 STRESS TTE COMPLETE: CPT | Mod: 26,,, | Performed by: INTERNAL MEDICINE

## 2023-01-11 NOTE — NURSING NOTE
2 patient identifier name and date of birth confirmed as stated by patient and matched with armband. Informed consent obtained. Dr Ryan here.

## 2023-06-19 ENCOUNTER — TELEPHONE (OUTPATIENT)
Dept: FAMILY MEDICINE | Facility: CLINIC | Age: 78
End: 2023-06-19
Payer: MEDICARE

## 2023-06-19 ENCOUNTER — OFFICE VISIT (OUTPATIENT)
Dept: FAMILY MEDICINE | Facility: CLINIC | Age: 78
End: 2023-06-19
Payer: MEDICARE

## 2023-06-19 VITALS
RESPIRATION RATE: 18 BRPM | DIASTOLIC BLOOD PRESSURE: 78 MMHG | HEART RATE: 57 BPM | WEIGHT: 181 LBS | SYSTOLIC BLOOD PRESSURE: 139 MMHG | OXYGEN SATURATION: 97 % | BODY MASS INDEX: 26.81 KG/M2 | HEIGHT: 69 IN

## 2023-06-19 DIAGNOSIS — E55.9 VITAMIN D DEFICIENCY: ICD-10-CM

## 2023-06-19 DIAGNOSIS — I10 ESSENTIAL HYPERTENSION: Primary | ICD-10-CM

## 2023-06-19 DIAGNOSIS — C61 PROSTATE CANCER: ICD-10-CM

## 2023-06-19 DIAGNOSIS — Z12.5 PROSTATE CANCER SCREENING: ICD-10-CM

## 2023-06-19 DIAGNOSIS — D69.6 THROMBOCYTOPENIA, UNSPECIFIED: ICD-10-CM

## 2023-06-19 DIAGNOSIS — Z79.899 HIGH RISK MEDICATION USE: ICD-10-CM

## 2023-06-19 DIAGNOSIS — I70.0 AORTIC ATHEROSCLEROSIS: ICD-10-CM

## 2023-06-19 DIAGNOSIS — Z85.46 H/O PROSTATE CANCER: ICD-10-CM

## 2023-06-19 DIAGNOSIS — Z00.00 WELL ADULT EXAM: ICD-10-CM

## 2023-06-19 DIAGNOSIS — R00.1 BRADYCARDIA: ICD-10-CM

## 2023-06-19 DIAGNOSIS — E78.2 MIXED HYPERLIPIDEMIA: Primary | ICD-10-CM

## 2023-06-19 DIAGNOSIS — I47.29 NSVT (NONSUSTAINED VENTRICULAR TACHYCARDIA): ICD-10-CM

## 2023-06-19 PROBLEM — N18.31 STAGE 3A CHRONIC KIDNEY DISEASE: Status: RESOLVED | Noted: 2022-12-16 | Resolved: 2023-06-19

## 2023-06-19 PROCEDURE — 99999 PR PBB SHADOW E&M-EST. PATIENT-LVL III: ICD-10-PCS | Mod: PBBFAC,,, | Performed by: NURSE PRACTITIONER

## 2023-06-19 PROCEDURE — 99213 OFFICE O/P EST LOW 20 MIN: CPT | Mod: PBBFAC | Performed by: NURSE PRACTITIONER

## 2023-06-19 PROCEDURE — 99214 OFFICE O/P EST MOD 30 MIN: CPT | Mod: S$PBB,,, | Performed by: NURSE PRACTITIONER

## 2023-06-19 PROCEDURE — 99214 PR OFFICE/OUTPT VISIT, EST, LEVL IV, 30-39 MIN: ICD-10-PCS | Mod: S$PBB,,, | Performed by: NURSE PRACTITIONER

## 2023-06-19 PROCEDURE — 99999 PR PBB SHADOW E&M-EST. PATIENT-LVL III: CPT | Mod: PBBFAC,,, | Performed by: NURSE PRACTITIONER

## 2023-06-19 NOTE — PROGRESS NOTES
"Subjective:       Patient ID: Norris Nelson is a 78 y.o. male.    Chief Complaint: Follow-up (HTN)  -  The patient is a 79 y/o male that presents for 6 mo follow up. Only c/o is bilateral hand pain most likely arthritic. Remains active and has no c/o. HTN and HLP controlled.     HCC:  Prostate ca: Dx . Proctectomy. PSA neg 2022. Seen by urology a few years ago. Will follow.   Thrombocytopenia: last Plt 144 2022. Stable will monitor.   NSVT: 2019 via holter monitor, single run of NSVT as noted above. Denies s/s. Otherwise normal.   Aortic arteriolosclerosis: CT abd 2020 "The aorta is normal in caliber with moderate calcific plaque" on statin. With HLP WNL 2022. Will monitor.   CKD 3a: resolved with GFR > 60 last 2 yrs. Will follow but removed.   -    Past Medical History:   Diagnosis Date    Carotid stenosis, left     mild    Cyst of left kidney     7.4 cm    Hyperlipidemia     Hypertension     Kidney stones     3 events    Kidney stones 2018    Prostate cancer 1995       Past Surgical History:   Procedure Laterality Date    COLONOSCOPY      normal     CYST REMOVAL      TESTICLE    PROSTATE SURGERY      CANCER REMOVAL    TONSILLECTOMY  1950        Social History     Socioeconomic History    Marital status:     Number of children: 3    Highest education level: Master's degree (e.g., MA, MS, Vanessa, MEd, MSW, NELDA)   Occupational History    Occupation: Retired[employer] worker   Tobacco Use    Smoking status: Former     Types: Cigarettes     Quit date: 1974     Years since quittin.4    Smokeless tobacco: Never   Substance and Sexual Activity    Alcohol use: Yes     Comment: Occasionally    Drug use: Never    Sexual activity: Yes     Partners: Female       Family History   Problem Relation Age of Onset    Stroke Mother     Early death Mother         39y/o smoker     Heart attack Father         67 y/o    Obesity Sister     Heart attack Brother     " Pacemaker/defibrilator Brother     Melanoma Neg Hx        Review of patient's allergies indicates:  No Known Allergies       Current Outpatient Medications:     atorvastatin (LIPITOR) 40 MG tablet, Take 1 tablet (40 mg total) by mouth every evening. TAKE 1 TABLET ONE TIME DAILY, Disp: 90 tablet, Rfl: 3    losartan (COZAAR) 100 MG tablet, TAKE 1 TABLET ONE TIME DAILY, Disp: 90 tablet, Rfl: 3    vitamin D (VITAMIN D3) 1000 units Tab, Take 1,000 Units by mouth once daily., Disp: , Rfl:     zinc gluconate 50 mg tablet, Take 50 mg by mouth once daily., Disp: , Rfl:     aspirin (ECOTRIN) 81 MG EC tablet, Take 1 tablet (81 mg total) by mouth once daily., Disp: 90 tablet, Rfl: 3    Follow-up    Review of Systems   Constitutional: Negative.    HENT: Negative.     Eyes: Negative.    Respiratory: Negative.     Cardiovascular: Negative.    Gastrointestinal: Negative.    Endocrine: Negative.    Genitourinary: Negative.    Musculoskeletal:         Bilateral hand pain   Skin: Negative.    Allergic/Immunologic: Negative.    Neurological: Negative.    Hematological: Negative.    Psychiatric/Behavioral: Negative.       Objective:      Physical Exam  Vitals reviewed.   Constitutional:       General: He is not in acute distress.     Appearance: Normal appearance. He is well-developed and normal weight. He is not ill-appearing.   HENT:      Head: Normocephalic.   Eyes:      Conjunctiva/sclera: Conjunctivae normal.   Neck:      Thyroid: No thyromegaly.   Cardiovascular:      Rate and Rhythm: Normal rate and regular rhythm.      Heart sounds: Normal heart sounds. No murmur heard.  Pulmonary:      Effort: Pulmonary effort is normal.      Breath sounds: Normal breath sounds. No wheezing or rales.   Musculoskeletal:         General: Normal range of motion.      Cervical back: Normal range of motion.      Right lower leg: No edema.      Left lower leg: No edema.   Skin:     General: Skin is warm and dry.   Neurological:      Mental Status: He  is alert and oriented to person, place, and time. Mental status is at baseline.   Psychiatric:         Mood and Affect: Mood normal.         Behavior: Behavior normal.         Thought Content: Thought content normal.         Judgment: Judgment normal.       Assessment:       1. Essential hypertension    2. Prostate cancer    3. Thrombocytopenia, unspecified    4. NSVT (nonsustained ventricular tachycardia)    5. Aortic atherosclerosis    6. Vitamin D deficiency        Plan:     1- recommend Voltaren gel to hands bid.  2- wellness labs 12/20/23 then RTC to see NP  3- see cardiology 12/18//23  -    Essential hypertension    Prostate cancer    Thrombocytopenia, unspecified    NSVT (nonsustained ventricular tachycardia)    Aortic atherosclerosis    Vitamin D deficiency  -     Vitamin D; Future; Expected date: 12/20/2023        Risks, benefits, and side effects were discussed with the patient. All questions were answered to the fullest satisfaction of the patient, and pt verbalized understanding and agreement to treatment plan. Pt was to call with any new or worsening symptoms, or present to the ER.

## 2023-12-11 DIAGNOSIS — E78.2 MIXED HYPERLIPIDEMIA: ICD-10-CM

## 2023-12-11 DIAGNOSIS — I10 ESSENTIAL HYPERTENSION: ICD-10-CM

## 2023-12-11 RX ORDER — ATORVASTATIN CALCIUM 40 MG/1
40 TABLET, FILM COATED ORAL NIGHTLY
Qty: 30 TABLET | Refills: 0 | Status: SHIPPED | OUTPATIENT
Start: 2023-12-11 | End: 2024-02-12 | Stop reason: SDUPTHER

## 2023-12-11 RX ORDER — LOSARTAN POTASSIUM 100 MG/1
TABLET ORAL
Qty: 30 TABLET | Refills: 0 | Status: SHIPPED | OUTPATIENT
Start: 2023-12-11 | End: 2024-02-12 | Stop reason: SDUPTHER

## 2023-12-11 NOTE — TELEPHONE ENCOUNTER
Dear Dr. Falk,     In the absence of Michelle Barfield, THONY, can you please address this patients concern or request?    Thank you,  Sadaf Pate LPN

## 2023-12-18 ENCOUNTER — TELEPHONE (OUTPATIENT)
Dept: FAMILY MEDICINE | Facility: CLINIC | Age: 78
End: 2023-12-18
Payer: MEDICARE

## 2023-12-18 ENCOUNTER — OFFICE VISIT (OUTPATIENT)
Dept: CARDIOLOGY | Facility: CLINIC | Age: 78
End: 2023-12-18
Payer: MEDICARE

## 2023-12-18 VITALS
BODY MASS INDEX: 26.85 KG/M2 | SYSTOLIC BLOOD PRESSURE: 186 MMHG | WEIGHT: 181.31 LBS | DIASTOLIC BLOOD PRESSURE: 88 MMHG | OXYGEN SATURATION: 100 % | HEART RATE: 48 BPM | HEIGHT: 69 IN

## 2023-12-18 DIAGNOSIS — F09 COGNITIVE DYSFUNCTION: ICD-10-CM

## 2023-12-18 DIAGNOSIS — I47.29 NSVT (NONSUSTAINED VENTRICULAR TACHYCARDIA): ICD-10-CM

## 2023-12-18 DIAGNOSIS — R00.1 BRADYCARDIA: ICD-10-CM

## 2023-12-18 DIAGNOSIS — I11.9 LVH (LEFT VENTRICULAR HYPERTROPHY) DUE TO HYPERTENSIVE DISEASE, WITHOUT HEART FAILURE: ICD-10-CM

## 2023-12-18 DIAGNOSIS — Z91.89 AT RISK FOR CARDIOVASCULAR EVENT: Primary | ICD-10-CM

## 2023-12-18 DIAGNOSIS — E65 ABDOMINAL OBESITY: ICD-10-CM

## 2023-12-18 DIAGNOSIS — I10 ESSENTIAL HYPERTENSION: ICD-10-CM

## 2023-12-18 PROCEDURE — 99999 PR PBB SHADOW E&M-EST. PATIENT-LVL IV: CPT | Mod: PBBFAC,,, | Performed by: INTERNAL MEDICINE

## 2023-12-18 PROCEDURE — 99999 PR PBB SHADOW E&M-EST. PATIENT-LVL IV: ICD-10-PCS | Mod: PBBFAC,,, | Performed by: INTERNAL MEDICINE

## 2023-12-18 PROCEDURE — 93010 ELECTROCARDIOGRAM REPORT: CPT | Mod: S$PBB,,, | Performed by: INTERNAL MEDICINE

## 2023-12-18 PROCEDURE — 93010 EKG 12-LEAD: ICD-10-PCS | Mod: S$PBB,,, | Performed by: INTERNAL MEDICINE

## 2023-12-18 PROCEDURE — 99215 PR OFFICE/OUTPT VISIT, EST, LEVL V, 40-54 MIN: ICD-10-PCS | Mod: 25,S$PBB,, | Performed by: INTERNAL MEDICINE

## 2023-12-18 PROCEDURE — 99214 OFFICE O/P EST MOD 30 MIN: CPT | Mod: PBBFAC | Performed by: INTERNAL MEDICINE

## 2023-12-18 PROCEDURE — 99215 OFFICE O/P EST HI 40 MIN: CPT | Mod: 25,S$PBB,, | Performed by: INTERNAL MEDICINE

## 2023-12-18 PROCEDURE — 93005 ELECTROCARDIOGRAM TRACING: CPT | Mod: PBBFAC | Performed by: INTERNAL MEDICINE

## 2023-12-18 NOTE — PATIENT INSTRUCTIONS
Recommended Mediterranean dietEating Heart-Healthy Food: Using the DASH Plan  Eating for your heart doesnt have to be hard or boring. You just need to know how to make healthier choices. The DASH eating plan has been developed to help you do just that. DASH stands for Dietary Approaches to Stop Hypertension. It is a plan that has been proven to be healthier for your heart and to lower your risk for high blood pressure. It can also help lower your risk for cancer, heart disease, osteoporosis, and diabetes.  Choosing from Each Food Group  Choose foods from each of the food groups below each day. Try to get the recommended number of servings for each food group. The serving numbers are based on a diet of 2,000 calories a day. Talk to your doctor if youre unsure about your calorie needs.  Grains   Servings: 7-8 a day  A serving is:  1 slice bread  1 ounce dry cereal  half a cup cooked rice or pasta  Best choices: Whole grains and any grains high in fiber.  Vegetables   Servings: 4-5 a day  A serving is:  1 cup raw leafy vegetable  Half a cup cooked vegetable  Three-quarter cup vegetable juice  Best choices: Fresh or frozen vegetable prepared without too much added salt or fat.    Fruits   Servings: 4-5 a day  A serving is:  Three-quarter cup fruit juice  1 medium fruit  One-quarter cup dried fruit  One-half cup fresh, frozen, or canned fruit  Best choices: A variety of fresh fruits of different colors. Whole fruits are a much better choice than fruit juices.  Low-fat or Fat Free Dairy   Servings: 2-3 a day  A serving is:  8 ounces milk  1 cup yogurt  One and a half ounces cheese  Best choices: Skim or 1% milk, low-fat or fat free yogurt or buttermilk, and low-fat cheeses.       Meat, Poultry, Fish   Servings: 2 or fewer a day  A serving is:  3 ounces cooked meat, poultry, or fish  Best choices: Lean meats and fish. Trim away visible fat. Broil, roast, or boil instead of frying. Remove skin from poultry before eating.   Nuts, Seeds, Beans   Servings: 4-5 a week  A serving is:  One third cup nuts (or one and a half ounces)  2 tablespoons sunflower seeds  Half a cup cooked beans  Best choices: Dry roasted nuts with no salt added, lentils, kidney beans, garbanzo beans, and whole laguerre beans.    Fats and Oils   Servings: 2 a day  A serving is:  1 teaspoon vegetable oil  1 teaspoon soft margarine  1 tablespoon low-fat mayonnaise  1 teaspoon regular mayonnaise  2 tablespoons light salad dressing  1 tablespoon regular salad dressing  Best choices: Monounsaturated and polyunsaturated fats such as olive, canola, or safflower oil.  Sweets   Servings: 5 a week or fewer  A serving is:  1 tablespoon sugar, maple syrup, or honey  1 tablespoon jam or jelly  1 half-ounce jelly beans (about 15)  8 ounces lemonade  Best choices: Dried fruit can be a satisfying sweet. Choose low-fat sweets when possible. And watch your serving sizes!

## 2023-12-18 NOTE — PROGRESS NOTES
" Patient ID:  Norris Nelson is a 78 y.o. male who presents to Follow-up  For cardiac follow up, HTN, HLD, mild Carotid stenosis, wife insist on follow up.  PCP: Michelle Barfield NP  Prior cardiologist, see yearly, Dr. Ji in [place]  Lives with wife, Soledad, here with patient, non-smoker  Retired  of water and sewage treatment, PT , 2 days a week, enjoying    Health literacy: Medium  Activities: ADL's, do yard work. No problem, not limited  Nicotine: Quit smoking in 1974, 1 pack/day x 10+ years  Alcohol: Socially, max 1 in any 24 hours. Just holidays.  Illicit drugs: Denies   Cardiac symptoms: None at present  Home BP: do not check  Medication compliance: Yes  Diet: regular  Caffeine: 1 cups/day, 1 coke/day  Labs:   Lab Results   Component Value Date    TSH 1.114 12/10/2021      No results found for: "LABA1C", "HGBA1C"    Lab Results   Component Value Date    WBC 4.05 12/19/2022    HGB 13.3 (L) 12/19/2022    HCT 40.4 12/19/2022    MCV 95 12/19/2022     (L) 12/19/2022       CMP  Sodium   Date Value Ref Range Status   12/19/2022 144 136 - 145 mmol/L Final     Potassium   Date Value Ref Range Status   12/19/2022 3.9 3.5 - 5.1 mmol/L Final     Chloride   Date Value Ref Range Status   12/19/2022 109 95 - 110 mmol/L Final     CO2   Date Value Ref Range Status   12/19/2022 27 23 - 29 mmol/L Final     Glucose   Date Value Ref Range Status   12/19/2022 95 70 - 110 mg/dL Final     BUN   Date Value Ref Range Status   12/19/2022 14 8 - 23 mg/dL Final     Creatinine   Date Value Ref Range Status   12/19/2022 1.1 0.5 - 1.4 mg/dL Final     Calcium   Date Value Ref Range Status   12/19/2022 9.9 8.7 - 10.5 mg/dL Final     Total Protein   Date Value Ref Range Status   12/19/2022 6.8 6.0 - 8.4 g/dL Final     Albumin   Date Value Ref Range Status   12/19/2022 3.6 3.5 - 5.2 g/dL Final     Total Bilirubin   Date Value Ref Range Status   12/19/2022 0.7 0.1 - 1.0 mg/dL " "Final     Comment:     For infants and newborns, interpretation of results should be based  on gestational age, weight and in agreement with clinical  observations.    Premature Infant recommended reference ranges:  Up to 24 hours.............<8.0 mg/dL  Up to 48 hours............<12.0 mg/dL  3-5 days..................<15.0 mg/dL  6-29 days.................<15.0 mg/dL       Alkaline Phosphatase   Date Value Ref Range Status   12/19/2022 90 55 - 135 U/L Final     AST   Date Value Ref Range Status   12/19/2022 23 10 - 40 U/L Final     ALT   Date Value Ref Range Status   12/19/2022 24 10 - 44 U/L Final     Anion Gap   Date Value Ref Range Status   12/19/2022 8 8 - 16 mmol/L Final     eGFR if    Date Value Ref Range Status   12/10/2021 >60.0 >60 mL/min/1.73 m^2 Final     eGFR if non    Date Value Ref Range Status   12/10/2021 52.3 (A) >60 mL/min/1.73 m^2 Final     Comment:     Calculation used to obtain the estimated glomerular filtration  rate (eGFR) is the CKD-EPI equation.        @labrcntip(troponini)@  No results found for: "BNP"}   Lab Results   Component Value Date    CHOL 134 12/19/2022    CHOL 141 12/10/2021    CHOL 148 12/09/2020     Lab Results   Component Value Date    HDL 59 12/19/2022    HDL 64 12/10/2021    HDL 71 12/09/2020     Lab Results   Component Value Date    LDLCALC 63.8 12/19/2022    LDLCALC 65.6 12/10/2021    LDLCALC 70.0 12/09/2020     Lab Results   Component Value Date    TRIG 56 12/19/2022    TRIG 57 12/10/2021    TRIG 35 12/09/2020     Lab Results   Component Value Date    CHOLHDL 44.0 12/19/2022    CHOLHDL 45.4 12/10/2021    CHOLHDL 48.0 12/09/2020     Lab Results   Component Value Date    IRON 99 12/19/2022    TRANSFERRIN 233 12/19/2022    TIBC 345 12/19/2022    FESATURATED 29 12/19/2022      Lab Results   Component Value Date    FERRITIN 97 12/19/2022     Urine 12/2022 negative for microalbluminuria    Last Echo: 12/2022, TMET   Last stress test: " "2022  Cardiovascular angiogram: None  ECG: SB with SA, rate 48, low voltage  Fundoscopic exam: due, no retinopathy detected    In 10/2019:  WM here for annual follow up, no specific heart condition but had premature family history for CAD and stoke, mother in her 40s and father  at age 68. No heart worries and no cardiac symptoms.     Ms. Michelle A. BRITTANY Barfield noted "Appt schedule with Dr. Ryan- no specific complaints encouraged to discuss left carotid stenosis mild noted in ."     In 2022, return at wife's request. No heart worries. At very high ASCVD 10-year event risk of almost 40%    Stress Echo 2020 - The patient's exercise capacity was excellent. functional capacity of 12 METS  The patient reached the end of the protocol.  During stress, the following significant arrhythmias were observed: occasional PVCs.  Eccentric left ventricular hypertrophy.  Mild left ventricular enlargement.  Normal left ventricular systolic function. The estimated ejection fraction is 65%.  Normal LV diastolic function.  Normal right ventricular systolic function.  Mild right atrial enlargement.  The ECG portion of this study is negative for myocardial ischemia.  The stress echo portion of this study is negative for myocardial ischemia.  No wall motion abnormalities.  Mild left atrial enlargement.    Carotid US - No significant Carotid disease seen.  Vertebral arteries shows antegrade flow.    Michelle Barfield NP noted 2022 - "Renal Stone CT 22 with Vasculature: Calcific atherosclerosis of the abdominal aorta.  No aneurysm taking 40 mg lipitor and last lipid panel 2021 WNL."    HPI comments: in 2023, return for annual review. No heart issue. Back working PT without problem.    TMET Echo 2023 - The left ventricle is normal in size with concentric hypertrophy and normal systolic function.  The estimated ejection fraction is 70%.  Normal left ventricular diastolic function.  Normal " right ventricular size.  Small pericardial effusion.  The stress echo portion of this study is negative for myocardial ischemia.  The patient's exercise capacity was above average. Achieved 10 METS.  The patient reached the end of the protocol.  During stress, the following significant arrhythmias were observed: occasional PACs, occasional PVCs.  The ECG portion of this study is negative for myocardial ischemia.  Mild left atrial enlargement.  Patient reported shortness of breath during stress. Shortness of breath reported by the patient was mild.    Holter 12/2022 - Monitoring started at 7:14 AM and continued for 47 hr 59 min. The average heart rate was 48 BPM. The minimum heart rate was 38 BPM, occurring at 1:45:31 PM D1. The maximum heart rate was 83 BPM, occurring at 10:37:52 PM D2.  Ventricular ectopic activity consisted of 109 beats, of which, 4 were in 1 run, 3 were in triplets, 96 were in single PVCs, 3 were single VEs, 3 were in bigeminy. The longest ventricular run occurred at 2:39:26 AM D1, consisting of 4 beats, with maximum heart rate of 156 BPM. The fastest ventricular run occurred at 2:39:26 AM D1, consisting of 4 beats, with maximum heart rate of 156 BPM.  Supraventricular ectopic activity consisted of 6663 beats (5.1% of complexes), of which, 128 were in atrial couplets, 18 were late beats, 4104 were single PACs, 2366 were in bigeminy, 47 were in trigeminy. The longest R-R interval was 2.0 seconds occurring at 6:44:51 PM D2. The longest N-N interval was 1.8 seconds occurring at 1:44:17 AM D2.  Predominant Rhythm Sinus rhythm with frequent PACs and single run of NSVT as noted above.  No symptom identified.    Review of Systems   Constitutional: Negative. Negative for diaphoresis, fever, malaise/fatigue, night sweats and weight gain.        Weight stable from 12/2022.   HENT:  Positive for hearing loss (Mederate hearing loss AU) and tinnitus. Negative for nosebleeds.    Eyes:  Negative for visual  disturbance.        Wears readers   Cardiovascular: Negative.  Negative for chest pain, claudication, cyanosis, dyspnea on exertion, irregular heartbeat, leg swelling, near-syncope, orthopnea, palpitations and paroxysmal nocturnal dyspnea.   Respiratory:  Positive for cough and sputum production. Negative for shortness of breath, sleep disturbances due to breathing, snoring and wheezing.         Wesley = 8, today a 7, awaken refreshed.   Endocrine: Negative.  Negative for polydipsia and polyuria.   Hematologic/Lymphatic: Negative.  Does not bruise/bleed easily.   Skin:  Negative for flushing, nail changes, poor wound healing and suspicious lesions. Color change: When bumps into something, states skin is very thin and easily tears.  Musculoskeletal:  Positive for arthritis (Knees), back pain (Lower back with no radiation) and joint pain (Knees). Negative for falls, gout, joint swelling, muscle weakness and myalgias. Muscle cramps: occassional Lower ext.  Gastrointestinal:  Positive for constipation, flatus and heartburn. Negative for hematemesis, hematochezia, melena and nausea.   Genitourinary: Negative.    Neurological: Negative.  Negative for disturbances in coordination, excessive daytime sleepiness, dizziness, focal weakness, headaches, light-headedness, loss of balance, numbness, vertigo and weakness.   Psychiatric/Behavioral:  Positive for memory loss. Negative for depression and substance abuse. The patient does not have insomnia and is not nervous/anxious.    Allergic/Immunologic: Negative.         Objective:    Physical Exam  Constitutional:       Appearance: He is well-developed.      Comments: RA O2 sat 100%  Orthostatic VS: sitting 211/88, standing 186/88   HENT:      Head: Normocephalic.   Eyes:      Conjunctiva/sclera: Conjunctivae normal.      Pupils: Pupils are equal, round, and reactive to light.   Neck:      Thyroid: No thyromegaly.      Vascular: No JVD.      Comments: Circumference  "15"  Cardiovascular:      Rate and Rhythm: Regular rhythm. Bradycardia present.      Pulses: Intact distal pulses.           Carotid pulses are 1+ on the right side and 1+ on the left side with bruit.       Radial pulses are 1+ on the right side and 1+ on the left side.        Dorsalis pedis pulses are 1+ on the right side and 1+ on the left side.        Posterior tibial pulses are 1+ on the right side and 1+ on the left side.      Heart sounds: Heart sounds are distant. No murmur heard.     No friction rub. No gallop.   Pulmonary:      Effort: Pulmonary effort is normal.      Breath sounds: Normal breath sounds. No rales.   Chest:      Chest wall: No tenderness.   Abdominal:      General: Bowel sounds are normal.      Palpations: Abdomen is soft.      Tenderness: There is no abdominal tenderness.      Comments: Waist 41.75" today 42", hip 40"   Musculoskeletal:         General: Normal range of motion.      Cervical back: Normal range of motion and neck supple.   Lymphadenopathy:      Cervical: No cervical adenopathy.   Skin:     General: Skin is warm and dry.      Findings: No rash.   Neurological:      Mental Status: He is alert and oriented to person, place, and time.           Assessment:       1. At risk for cardiovascular event, ASCVD 10-year risk 39%, 2021    2. Essential hypertension, dx 2010    3. Bradycardia    4. LVH (left ventricular hypertrophy) due to hypertensive disease, without heart failure    5. NSVT (nonsustained ventricular tachycardia)    6. Abdominal obesity    7. Cognitive dysfunction         Plan:         At risk for cardiovascular event, ASCVD 10-year risk 39%, 2021  -     IN OFFICE EKG 12-LEAD (to Muse)    Essential hypertension, dx 2010    Bradycardia    LVH (left ventricular hypertrophy) due to hypertensive disease, without heart failure    NSVT (nonsustained ventricular tachycardia)    Abdominal obesity    Cognitive dysfunction    - All medical issues reviewed, continue current Rx. " Aspirin is now consider optional for primary prevention of CV disease.   - Warning signs of MI and stroke given, if symptoms last more than 5 minutes, stop immediately and call 911, then chew 2-4 low-dose ASA (81 mg).  - Consider use of Potassium chloride salt substitute, [place] Nu-Salt.    - Need annual fundoscopic examination.  - CV status and all medications reviewed, patient acknowledge good understanding.  - Recommend healthy living: moderate alcohol, healthy diet and regular exercise aiming for fitness, restorative sleep and weight control  - Discussed healthy daily limit of 1 oz of pure alcohol in any 24 hours (roughly 2 12-oz beers, 8 oz of wine (8%-12% alcohol), or 2.5 oz of liquor (80 proof)), can not save up.   - Instruction for Mediterranean, high potassium diet and heart healthy exercise given.  - Check home blood pressure, 2 days weekly, do 2 readings within 5 minutes in AM and PM, keep log for review. Target resting BP is less than 130/80.   - Have to do some abdominal exercise to rid belly fat  - Highly recommend 30-60 minutes of exercise / activities daily, can have Sunday off, with 2-3 sessions of muscle strengthening weekly. A  would be very helpful.  - Recommend at least annual cardiovascular evaluation in view of patient's significant risk factors.  - Phone review / encourage use of [facility].     Total time spend including review of record prior to face-to-face visit is 40 minutes. Greater than 50% of the time was spent in counseling and coordination of care. The above assessment and plan have been discussed at length. Referring provider's note reviewed. Labs and procedure over the last 6 months reviewed. Problem List updated. Asked to bring in all active medications / pills bottles with next visit. Will send note to referring / PCP.  .

## 2023-12-18 NOTE — TELEPHONE ENCOUNTER
Spoke with patient informed him of provider out till around March, but Dr Falk has filled his 2 medications for one month supply, rescheduled appointment for 12/27 at 10 am in [place] with Gabrielle Ramos, patient voiced understanding

## 2023-12-20 ENCOUNTER — LAB VISIT (OUTPATIENT)
Dept: LAB | Facility: HOSPITAL | Age: 78
End: 2023-12-20
Attending: NURSE PRACTITIONER
Payer: MEDICARE

## 2023-12-20 DIAGNOSIS — Z12.5 PROSTATE CANCER SCREENING: ICD-10-CM

## 2023-12-20 DIAGNOSIS — Z79.899 HIGH RISK MEDICATION USE: ICD-10-CM

## 2023-12-20 DIAGNOSIS — Z00.00 WELL ADULT EXAM: ICD-10-CM

## 2023-12-20 DIAGNOSIS — E78.2 MIXED HYPERLIPIDEMIA: ICD-10-CM

## 2023-12-20 DIAGNOSIS — E55.9 VITAMIN D DEFICIENCY: ICD-10-CM

## 2023-12-20 DIAGNOSIS — R00.1 BRADYCARDIA: ICD-10-CM

## 2023-12-20 DIAGNOSIS — Z85.46 H/O PROSTATE CANCER: ICD-10-CM

## 2023-12-20 LAB
25(OH)D3+25(OH)D2 SERPL-MCNC: 76 NG/ML (ref 30–96)
ALBUMIN SERPL BCP-MCNC: 3.7 G/DL (ref 3.5–5.2)
ALP SERPL-CCNC: 86 U/L (ref 55–135)
ALT SERPL W/O P-5'-P-CCNC: 17 U/L (ref 10–44)
ANION GAP SERPL CALC-SCNC: 10 MMOL/L (ref 8–16)
AST SERPL-CCNC: 22 U/L (ref 10–40)
BASOPHILS # BLD AUTO: 0.02 K/UL (ref 0–0.2)
BASOPHILS NFR BLD: 0.5 % (ref 0–1.9)
BILIRUB SERPL-MCNC: 0.6 MG/DL (ref 0.1–1)
BUN SERPL-MCNC: 17 MG/DL (ref 8–23)
CALCIUM SERPL-MCNC: 9.4 MG/DL (ref 8.7–10.5)
CHLORIDE SERPL-SCNC: 107 MMOL/L (ref 95–110)
CHOLEST SERPL-MCNC: 130 MG/DL (ref 120–199)
CHOLEST/HDLC SERPL: 2.1 {RATIO} (ref 2–5)
CO2 SERPL-SCNC: 25 MMOL/L (ref 23–29)
COMPLEXED PSA SERPL-MCNC: <0.01 NG/ML (ref 0–4)
CREAT SERPL-MCNC: 1.4 MG/DL (ref 0.5–1.4)
DIFFERENTIAL METHOD: ABNORMAL
EOSINOPHIL # BLD AUTO: 0.1 K/UL (ref 0–0.5)
EOSINOPHIL NFR BLD: 3.5 % (ref 0–8)
ERYTHROCYTE [DISTWIDTH] IN BLOOD BY AUTOMATED COUNT: 12.2 % (ref 11.5–14.5)
EST. GFR  (NO RACE VARIABLE): 50.8 ML/MIN/1.73 M^2
GLUCOSE SERPL-MCNC: 113 MG/DL (ref 70–110)
HCT VFR BLD AUTO: 40.2 % (ref 40–54)
HDLC SERPL-MCNC: 63 MG/DL (ref 40–75)
HDLC SERPL: 48.5 % (ref 20–50)
HGB BLD-MCNC: 13.2 G/DL (ref 14–18)
IMM GRANULOCYTES # BLD AUTO: 0.01 K/UL (ref 0–0.04)
IMM GRANULOCYTES NFR BLD AUTO: 0.3 % (ref 0–0.5)
LDLC SERPL CALC-MCNC: 44.6 MG/DL (ref 63–159)
LYMPHOCYTES # BLD AUTO: 1.1 K/UL (ref 1–4.8)
LYMPHOCYTES NFR BLD: 27.8 % (ref 18–48)
MCH RBC QN AUTO: 31.2 PG (ref 27–31)
MCHC RBC AUTO-ENTMCNC: 32.8 G/DL (ref 32–36)
MCV RBC AUTO: 95 FL (ref 82–98)
MONOCYTES # BLD AUTO: 0.2 K/UL (ref 0.3–1)
MONOCYTES NFR BLD: 4.3 % (ref 4–15)
NEUTROPHILS # BLD AUTO: 2.5 K/UL (ref 1.8–7.7)
NEUTROPHILS NFR BLD: 63.6 % (ref 38–73)
NONHDLC SERPL-MCNC: 67 MG/DL
NRBC BLD-RTO: 0 /100 WBC
PLATELET # BLD AUTO: 149 K/UL (ref 150–450)
PMV BLD AUTO: 10 FL (ref 9.2–12.9)
POTASSIUM SERPL-SCNC: 4 MMOL/L (ref 3.5–5.1)
PROT SERPL-MCNC: 6.9 G/DL (ref 6–8.4)
RBC # BLD AUTO: 4.23 M/UL (ref 4.6–6.2)
SODIUM SERPL-SCNC: 142 MMOL/L (ref 136–145)
T4 FREE SERPL-MCNC: 1.01 NG/DL (ref 0.71–1.51)
TRIGL SERPL-MCNC: 112 MG/DL (ref 30–150)
TSH SERPL DL<=0.005 MIU/L-ACNC: 0.85 UIU/ML (ref 0.4–4)
WBC # BLD AUTO: 3.99 K/UL (ref 3.9–12.7)

## 2023-12-20 PROCEDURE — 80053 COMPREHEN METABOLIC PANEL: CPT | Performed by: NURSE PRACTITIONER

## 2023-12-20 PROCEDURE — 80061 LIPID PANEL: CPT | Performed by: NURSE PRACTITIONER

## 2023-12-20 PROCEDURE — 84153 ASSAY OF PSA TOTAL: CPT | Performed by: NURSE PRACTITIONER

## 2023-12-20 PROCEDURE — 82306 VITAMIN D 25 HYDROXY: CPT | Performed by: NURSE PRACTITIONER

## 2023-12-20 PROCEDURE — 36415 COLL VENOUS BLD VENIPUNCTURE: CPT | Performed by: NURSE PRACTITIONER

## 2023-12-20 PROCEDURE — 85025 COMPLETE CBC W/AUTO DIFF WBC: CPT | Performed by: NURSE PRACTITIONER

## 2023-12-20 PROCEDURE — 84443 ASSAY THYROID STIM HORMONE: CPT | Performed by: NURSE PRACTITIONER

## 2023-12-20 PROCEDURE — 84439 ASSAY OF FREE THYROXINE: CPT | Performed by: NURSE PRACTITIONER

## 2023-12-27 ENCOUNTER — OFFICE VISIT (OUTPATIENT)
Dept: FAMILY MEDICINE | Facility: CLINIC | Age: 78
End: 2023-12-27
Payer: MEDICARE

## 2023-12-27 VITALS
BODY MASS INDEX: 27.33 KG/M2 | SYSTOLIC BLOOD PRESSURE: 200 MMHG | WEIGHT: 184.5 LBS | DIASTOLIC BLOOD PRESSURE: 92 MMHG | OXYGEN SATURATION: 97 % | RESPIRATION RATE: 16 BRPM | HEART RATE: 53 BPM | HEIGHT: 69 IN

## 2023-12-27 DIAGNOSIS — N18.31 STAGE 3A CHRONIC KIDNEY DISEASE: ICD-10-CM

## 2023-12-27 DIAGNOSIS — R00.1 BRADYCARDIA: ICD-10-CM

## 2023-12-27 DIAGNOSIS — E78.2 MIXED HYPERLIPIDEMIA: ICD-10-CM

## 2023-12-27 DIAGNOSIS — I70.0 AORTIC ATHEROSCLEROSIS: ICD-10-CM

## 2023-12-27 DIAGNOSIS — I10 ESSENTIAL HYPERTENSION: Primary | ICD-10-CM

## 2023-12-27 PROCEDURE — 99214 PR OFFICE/OUTPT VISIT, EST, LEVL IV, 30-39 MIN: ICD-10-PCS | Mod: S$PBB,,,

## 2023-12-27 PROCEDURE — 99214 OFFICE O/P EST MOD 30 MIN: CPT | Mod: S$PBB,,,

## 2023-12-27 PROCEDURE — 99999 PR PBB SHADOW E&M-EST. PATIENT-LVL IV: ICD-10-PCS | Mod: PBBFAC,,,

## 2023-12-27 PROCEDURE — 99999 PR PBB SHADOW E&M-EST. PATIENT-LVL IV: CPT | Mod: PBBFAC,,,

## 2023-12-27 PROCEDURE — 99214 OFFICE O/P EST MOD 30 MIN: CPT | Mod: PBBFAC,PN

## 2023-12-27 RX ORDER — AMLODIPINE BESYLATE 5 MG/1
5 TABLET ORAL DAILY
Qty: 30 TABLET | Refills: 2 | Status: SHIPPED | OUTPATIENT
Start: 2023-12-27 | End: 2024-03-11

## 2023-12-27 NOTE — PATIENT INSTRUCTIONS

## 2023-12-27 NOTE — PROGRESS NOTES
Subjective:       Patient ID: Norris Nelson is a 78 y.o. male.    Chief Complaint: Follow-up (6m f/u)    Patient presents to the clinic for a 6 month follow up.     Patient Active Problem List:     Bradycardia     Essential hypertension, dx 2010     Stenosis of left carotid artery, 2015     Bruit of left carotid artery     BMI 27.0-27.9,adult     Abdominal obesity     Mixed hyperlipidemia     Aortic atherosclerosis     Anemia     At risk for cardiovascular event, ASCVD 10-year risk 39%, 2021     NSVT (nonsustained ventricular tachycardia)     Prostate cancer     Thrombocytopenia, unspecified     LVH (left ventricular hypertrophy) due to hypertensive disease, without heart failure     Cognitive dysfunction    Hypertension-  BP Readings from Last 3 Encounters:  12/27/23 : (!) 200/92  12/18/23 : (!) 186/88  06/19/23 : 139/78  Taking Losartan 100 mg Daily.   States he has been monitoring his BP since his appt with Dr. Ryan and it has been running around 170's.   Repeat BP today 180/82.     Has no new complaints or concerns today.     Patient educated on plan of care, verbalized understanding.           Review of Systems   Constitutional:  Negative for activity change, appetite change, chills, diaphoresis and fever.   HENT:  Negative for congestion, ear pain, postnasal drip, sinus pressure, sneezing and sore throat.    Eyes:  Negative for pain, discharge, redness and itching.   Respiratory:  Negative for apnea, cough, chest tightness, shortness of breath and wheezing.    Cardiovascular:  Negative for chest pain and leg swelling.   Gastrointestinal:  Negative for abdominal distention, abdominal pain, constipation, diarrhea, nausea and vomiting.   Genitourinary:  Negative for difficulty urinating, dysuria, flank pain and frequency.   Musculoskeletal:  Positive for back pain.   Skin:  Negative for color change, rash and wound.   Neurological:  Negative for dizziness.   All other systems reviewed and are negative.       Patient Active Problem List   Diagnosis    Bradycardia    Essential hypertension, dx 2010    Stenosis of left carotid artery, 2015    Bruit of left carotid artery    BMI 27.0-27.9,adult    Abdominal obesity    Mixed hyperlipidemia    Aortic atherosclerosis    Anemia    At risk for cardiovascular event, ASCVD 10-year risk 39%, 2021    NSVT (nonsustained ventricular tachycardia)    Prostate cancer    Thrombocytopenia, unspecified    LVH (left ventricular hypertrophy) due to hypertensive disease, without heart failure    Cognitive dysfunction       Objective:      Physical Exam  Vitals and nursing note reviewed.   Constitutional:       Appearance: Normal appearance. He is not ill-appearing.   HENT:      Head: Normocephalic and atraumatic.      Nose: Nose normal.   Eyes:      General: Lids are normal.   Cardiovascular:      Rate and Rhythm: Normal rate and regular rhythm.      Pulses: Normal pulses.      Heart sounds: Normal heart sounds.   Pulmonary:      Effort: Pulmonary effort is normal. No tachypnea or respiratory distress.      Breath sounds: Normal breath sounds. No wheezing.   Abdominal:      General: Bowel sounds are normal. There is no distension.      Palpations: Abdomen is soft.      Tenderness: There is no abdominal tenderness.   Musculoskeletal:         General: Normal range of motion.      Cervical back: Full passive range of motion without pain and normal range of motion.      Left lower leg: No edema.   Skin:     General: Skin is warm and dry.   Neurological:      Mental Status: He is alert and oriented to person, place, and time.   Psychiatric:         Mood and Affect: Mood normal.         Behavior: Behavior normal.         Lab Results   Component Value Date    WBC 3.99 12/20/2023    HGB 13.2 (L) 12/20/2023    HCT 40.2 12/20/2023     (L) 12/20/2023    CHOL 130 12/20/2023    TRIG 112 12/20/2023    HDL 63 12/20/2023    ALT 17 12/20/2023    AST 22 12/20/2023     12/20/2023    K 4.0  "12/20/2023     12/20/2023    CREATININE 1.4 12/20/2023    BUN 17 12/20/2023    CO2 25 12/20/2023    TSH 0.851 12/20/2023    PSA <0.01 12/19/2022     The ASCVD Risk score (Marge RAZA, et al., 2019) failed to calculate for the following reasons:    The 2019 ASCVD risk score is only valid for ages 40 to 79  Visit Vitals  BP (!) 200/92 (BP Location: Left arm, Patient Position: Sitting, BP Method: Medium (Manual))   Pulse (!) 53   Resp 16   Ht 5' 9" (1.753 m)   Wt 83.7 kg (184 lb 8 oz)   SpO2 97%   BMI 27.25 kg/m²      Assessment:       1. Essential hypertension    2. Bradycardia    3. Mixed hyperlipidemia    4. Aortic atherosclerosis    5. Stage 3a chronic kidney disease        Plan:       1. Essential hypertension/  -     amLODIPine (NORVABradycardiaSC) 5 MG tablet; Take 1 tablet (5 mg total) by mouth once daily.  Dispense: 30 tablet; Refill: 2   - Continue Losartan   - 2 week BP check- bring daily BP log   -   The patient was counseled on HTN education, management and recommendations.   Patient was encouraged to adhere to a low sodium diet and a DASH diet was recommended.    2. Mixed hyperlipidemia   - Stable-Continue Lipitor   - Low fat, low cholesterol diet   - Continue current plan of care    3. Aortic atherosclerosis   - Stable-Continue Lipitor, ASA   - Low fat, low cholesterol diet   - Follow up with Cardiology- Dr. Ryan   - Continue current plan of care    4. Stage 3a chronic kidney disease   - Stable-Avoid Nephrotoxic drugs    - Continue current plan of care       Follow up in about 3 months (around 3/27/2024), or if symptoms worsen or fail to improve.      Future Appointments       Date Provider Specialty Appt Notes    1/10/2024  Family Medicine 2 week BP recheck    4/29/2024 Michelle Barfield NP Family Medicine 3 month follow up    12/18/2024 Cesar Ryan MD Cardiology Annual             "

## 2024-01-08 DIAGNOSIS — I10 ESSENTIAL HYPERTENSION: ICD-10-CM

## 2024-01-08 DIAGNOSIS — E78.2 MIXED HYPERLIPIDEMIA: ICD-10-CM

## 2024-01-08 RX ORDER — ATORVASTATIN CALCIUM 40 MG/1
40 TABLET, FILM COATED ORAL NIGHTLY
Qty: 30 TABLET | Refills: 0 | Status: CANCELLED | OUTPATIENT
Start: 2024-01-08

## 2024-01-08 RX ORDER — LOSARTAN POTASSIUM 100 MG/1
TABLET ORAL
Qty: 30 TABLET | Refills: 0 | Status: CANCELLED | OUTPATIENT
Start: 2024-01-08

## 2024-01-23 DIAGNOSIS — E78.2 MIXED HYPERLIPIDEMIA: ICD-10-CM

## 2024-01-23 RX ORDER — ATORVASTATIN CALCIUM 40 MG/1
40 TABLET, FILM COATED ORAL NIGHTLY
Qty: 30 TABLET | Refills: 0 | Status: CANCELLED | OUTPATIENT
Start: 2024-01-23

## 2024-02-09 ENCOUNTER — TELEPHONE (OUTPATIENT)
Dept: FAMILY MEDICINE | Facility: CLINIC | Age: 79
End: 2024-02-09
Payer: MEDICARE

## 2024-02-09 DIAGNOSIS — I10 ESSENTIAL HYPERTENSION: ICD-10-CM

## 2024-02-09 DIAGNOSIS — E78.2 MIXED HYPERLIPIDEMIA: ICD-10-CM

## 2024-02-09 NOTE — TELEPHONE ENCOUNTER
----- Message from Licha Donis sent at 2/9/2024 11:09 AM CST -----  Contact: Elenita  Type:  RX Refill Request    Who Called:  [facility]  Refill or New Rx: refill  RX Name and Strength:    atorvastatin (LIPITOR) 40 MG tablet  losartan (COZAAR) 100 MG tablet  How is the patient currently taking it? (ex. 1XDay):Directed   Is this a 30 day or 90 day RX: 90  Preferred Pharmacy with phone number:    [facility] - [place], [place] - [address]  [address] [address]  Phone: [phone] Fax: [phone]    Best Call Back Number:  [phone]  Additional Information: LOV 12/27/23 , no refills left

## 2024-02-12 RX ORDER — LOSARTAN POTASSIUM 100 MG/1
TABLET ORAL
Qty: 90 TABLET | Refills: 1 | Status: SHIPPED | OUTPATIENT
Start: 2024-02-12 | End: 2024-03-06

## 2024-02-12 RX ORDER — ATORVASTATIN CALCIUM 40 MG/1
40 TABLET, FILM COATED ORAL NIGHTLY
Qty: 90 TABLET | Refills: 1 | Status: SHIPPED | OUTPATIENT
Start: 2024-02-12

## 2024-03-06 ENCOUNTER — LAB VISIT (OUTPATIENT)
Dept: LAB | Facility: HOSPITAL | Age: 79
End: 2024-03-06
Attending: FAMILY MEDICINE
Payer: MEDICARE

## 2024-03-06 ENCOUNTER — OFFICE VISIT (OUTPATIENT)
Dept: FAMILY MEDICINE | Facility: CLINIC | Age: 79
End: 2024-03-06
Payer: MEDICARE

## 2024-03-06 VITALS
WEIGHT: 183.19 LBS | DIASTOLIC BLOOD PRESSURE: 74 MMHG | HEART RATE: 50 BPM | SYSTOLIC BLOOD PRESSURE: 150 MMHG | BODY MASS INDEX: 27.13 KG/M2 | HEIGHT: 69 IN | OXYGEN SATURATION: 99 %

## 2024-03-06 DIAGNOSIS — I10 PRIMARY HYPERTENSION: ICD-10-CM

## 2024-03-06 DIAGNOSIS — I70.0 AORTIC ATHEROSCLEROSIS: ICD-10-CM

## 2024-03-06 DIAGNOSIS — I47.29 NSVT (NONSUSTAINED VENTRICULAR TACHYCARDIA): ICD-10-CM

## 2024-03-06 DIAGNOSIS — D69.6 THROMBOCYTOPENIA, UNSPECIFIED: ICD-10-CM

## 2024-03-06 DIAGNOSIS — C61 PROSTATE CANCER: ICD-10-CM

## 2024-03-06 DIAGNOSIS — L98.9 SKIN LESION: Primary | ICD-10-CM

## 2024-03-06 DIAGNOSIS — N18.31 STAGE 3A CHRONIC KIDNEY DISEASE: ICD-10-CM

## 2024-03-06 DIAGNOSIS — R73.9 ELEVATED SERUM GLUCOSE: ICD-10-CM

## 2024-03-06 LAB
ALBUMIN SERPL BCP-MCNC: 3.7 G/DL (ref 3.5–5.2)
ALP SERPL-CCNC: 89 U/L (ref 55–135)
ALT SERPL W/O P-5'-P-CCNC: 18 U/L (ref 10–44)
ANION GAP SERPL CALC-SCNC: 10 MMOL/L (ref 8–16)
AST SERPL-CCNC: 22 U/L (ref 10–40)
BILIRUB SERPL-MCNC: 0.7 MG/DL (ref 0.1–1)
BUN SERPL-MCNC: 21 MG/DL (ref 8–23)
CALCIUM SERPL-MCNC: 9.1 MG/DL (ref 8.7–10.5)
CHLORIDE SERPL-SCNC: 108 MMOL/L (ref 95–110)
CO2 SERPL-SCNC: 25 MMOL/L (ref 23–29)
CREAT SERPL-MCNC: 1.2 MG/DL (ref 0.5–1.4)
EST. GFR  (NO RACE VARIABLE): >60 ML/MIN/1.73 M^2
ESTIMATED AVG GLUCOSE: 108 MG/DL (ref 68–131)
GLUCOSE SERPL-MCNC: 86 MG/DL (ref 70–110)
HBA1C MFR BLD: 5.4 % (ref 4–5.6)
POTASSIUM SERPL-SCNC: 4 MMOL/L (ref 3.5–5.1)
PROT SERPL-MCNC: 6.6 G/DL (ref 6–8.4)
SODIUM SERPL-SCNC: 143 MMOL/L (ref 136–145)

## 2024-03-06 PROCEDURE — 83036 HEMOGLOBIN GLYCOSYLATED A1C: CPT | Performed by: FAMILY MEDICINE

## 2024-03-06 PROCEDURE — 36415 COLL VENOUS BLD VENIPUNCTURE: CPT | Performed by: FAMILY MEDICINE

## 2024-03-06 PROCEDURE — 99999 PR PBB SHADOW E&M-EST. PATIENT-LVL IV: CPT | Mod: PBBFAC,,, | Performed by: FAMILY MEDICINE

## 2024-03-06 PROCEDURE — 99214 OFFICE O/P EST MOD 30 MIN: CPT | Mod: PBBFAC | Performed by: FAMILY MEDICINE

## 2024-03-06 PROCEDURE — 99214 OFFICE O/P EST MOD 30 MIN: CPT | Mod: S$PBB,,, | Performed by: FAMILY MEDICINE

## 2024-03-06 PROCEDURE — 80053 COMPREHEN METABOLIC PANEL: CPT | Performed by: FAMILY MEDICINE

## 2024-03-06 RX ORDER — LOSARTAN POTASSIUM AND HYDROCHLOROTHIAZIDE 25; 100 MG/1; MG/1
1 TABLET ORAL DAILY
Qty: 90 TABLET | Refills: 3 | Status: SHIPPED | OUTPATIENT
Start: 2024-03-06 | End: 2025-03-06

## 2024-03-06 NOTE — PROGRESS NOTES
"Subjective:       Patient ID: Norris Nelson is a 79 y.o. male.    Chief Complaint: Mass (Pt presenting to clinic for bump on left side of face. He states it has been present for 6 months. Pt is experiencing no pain.)    HPI  Review of Systems    Patient Active Problem List   Diagnosis    Bradycardia    Essential hypertension, dx 2010    Stenosis of left carotid artery, 2015    Bruit of left carotid artery    BMI 27.0-27.9,adult    Abdominal obesity    Mixed hyperlipidemia    Aortic atherosclerosis    Anemia    Stage 3a chronic kidney disease    At risk for cardiovascular event, ASCVD 10-year risk 39%, 2021    NSVT (nonsustained ventricular tachycardia)    Prostate cancer    Thrombocytopenia, unspecified    LVH (left ventricular hypertrophy) due to hypertensive disease, without heart failure    Cognitive dysfunction       Objective:      Physical Exam    Lab Results   Component Value Date    WBC 3.99 12/20/2023    HGB 13.2 (L) 12/20/2023    HCT 40.2 12/20/2023     (L) 12/20/2023    CHOL 130 12/20/2023    TRIG 112 12/20/2023    HDL 63 12/20/2023    ALT 17 12/20/2023    AST 22 12/20/2023     12/20/2023    K 4.0 12/20/2023     12/20/2023    CREATININE 1.4 12/20/2023    BUN 17 12/20/2023    CO2 25 12/20/2023    TSH 0.851 12/20/2023    PSA <0.01 12/19/2022     The ASCVD Risk score (Marge DK, et al., 2019) failed to calculate for the following reasons:    The 2019 ASCVD risk score is only valid for ages 40 to 79  Visit Vitals  BP (!) 150/74 (BP Location: Right arm, Patient Position: Sitting, BP Method: Medium (Manual))   Pulse (!) 50   Ht 5' 9" (1.753 m)   Wt 83.1 kg (183 lb 3.2 oz)   SpO2 99%   BMI 27.05 kg/m²      Assessment:       1. Skin lesion    2. Prostate cancer    3. NSVT (nonsustained ventricular tachycardia)    4. Thrombocytopenia, unspecified    5. Aortic atherosclerosis    6. Stage 3a chronic kidney disease    7. Primary hypertension    8. Elevated serum glucose        Plan:       1. Skin " lesion  -     Ambulatory referral/consult to Dermatology; Future; Expected date: 03/13/2024    2. Prostate cancer- s/p prostatectomy, followed by [facility], PSA 0.01 in 12/2023    3. NSVT (nonsustained ventricular tachycardia)-resolved    4. Thrombocytopenia, unspecified- stable, labs done q 6 months    5. Aortic atherosclerosis- stable, followed by Dr Ryan    6. Stage 3a chronic kidney disease- stable, labs done regularly       Follow up in about 6 months (around 9/6/2024), or if symptoms worsen or fail to improve, needs BP check 1 week after starting new bp med.      Future Appointments       Date Provider Specialty Appt Notes    3/28/2024  Family Medicine b/p recheck    4/29/2024 Michelle Barfield NP Family Medicine 3 month follow up    6/27/2024 Rubia Goins MD Dermatology Arpan on cheek    12/18/2024 Cesar Ryan MD Cardiology Annual

## 2024-03-11 DIAGNOSIS — I10 ESSENTIAL HYPERTENSION: ICD-10-CM

## 2024-03-11 RX ORDER — AMLODIPINE BESYLATE 5 MG/1
5 TABLET ORAL
Qty: 90 TABLET | Refills: 3 | Status: SHIPPED | OUTPATIENT
Start: 2024-03-11

## 2024-03-12 ENCOUNTER — OFFICE VISIT (OUTPATIENT)
Dept: DERMATOLOGY | Facility: CLINIC | Age: 79
End: 2024-03-12
Payer: MEDICARE

## 2024-03-12 VITALS — HEIGHT: 69 IN | BODY MASS INDEX: 26.45 KG/M2 | WEIGHT: 178.56 LBS

## 2024-03-12 DIAGNOSIS — Z85.828 ENCOUNTER FOR FOLLOW-UP SURVEILLANCE OF SKIN CANCER: ICD-10-CM

## 2024-03-12 DIAGNOSIS — Z08 ENCOUNTER FOR FOLLOW-UP SURVEILLANCE OF SKIN CANCER: ICD-10-CM

## 2024-03-12 DIAGNOSIS — L82.1 SEBORRHEIC KERATOSES: ICD-10-CM

## 2024-03-12 DIAGNOSIS — L57.0 ACTINIC KERATOSES: ICD-10-CM

## 2024-03-12 DIAGNOSIS — D48.5 NEOPLASM OF UNCERTAIN BEHAVIOR OF SKIN: Primary | ICD-10-CM

## 2024-03-12 DIAGNOSIS — D22.9 MULTIPLE BENIGN NEVI: ICD-10-CM

## 2024-03-12 PROCEDURE — 17003 DESTRUCT PREMALG LES 2-14: CPT | Mod: AQ,S$GLB,, | Performed by: DERMATOLOGY

## 2024-03-12 PROCEDURE — 17000 DESTRUCT PREMALG LESION: CPT | Mod: AQ,XS,S$GLB, | Performed by: DERMATOLOGY

## 2024-03-12 PROCEDURE — 11102 TANGNTL BX SKIN SINGLE LES: CPT | Mod: AQ,S$GLB,, | Performed by: DERMATOLOGY

## 2024-03-12 PROCEDURE — 99213 OFFICE O/P EST LOW 20 MIN: CPT | Mod: 25,AQ,S$GLB, | Performed by: DERMATOLOGY

## 2024-03-12 PROCEDURE — 88305 TISSUE EXAM BY PATHOLOGIST: CPT | Mod: 26,,, | Performed by: PATHOLOGY

## 2024-03-12 PROCEDURE — 88305 TISSUE EXAM BY PATHOLOGIST: CPT | Performed by: PATHOLOGY

## 2024-03-12 NOTE — PROGRESS NOTES
Subjective:      Patient ID:  Norris Nelson is a 79 y.o. male who presents for   Chief Complaint   Patient presents with    Spot     R cheek     LOV 2/17/22 - NUB, AK, SK, nevi     1.  Skin, left posterior helix, shave biopsy:   - SQUAMOUS CELL CARCINOMA IN SITU/ BOWEN'S DISEASE, AT LEAST.   - THE ATYPICAL SQUAMOUS EPITHELIUM EXTENDS TO THE BASE OF THE BIOPSY, AND AN   UNDERLYING INVASIVE SQUAMOUS CELL CARCINOMA CANNOT BE EXCLUDED.     Skin, left posterior helix, excision:  -SCAR (POST-SURGICAL)  -NEGATIVE FOR RESIDUAL SQUAMOUS CELL CARCINOMA IN-SITU    Patient here today for spot on R cheek x 6months  Pt states it appeared out of nowhere and never went away  Dry, itchy, and no treatment    Derm Hx:  Phx of SCCIS left posterior helix s/p E&S 2022  Denies Fhx of MM    Current Outpatient Medications:   ·  amLODIPine (NORVASC) 5 MG tablet, TAKE 1 TABLET ONE TIME DAILY, Disp: 90 tablet, Rfl: 3  ·  atorvastatin (LIPITOR) 40 MG tablet, Take 1 tablet (40 mg total) by mouth every evening., Disp: 90 tablet, Rfl: 1  ·  losartan-hydrochlorothiazide 100-25 mg (HYZAAR) 100-25 mg per tablet, Take 1 tablet by mouth once daily., Disp: 90 tablet, Rfl: 3  ·  vitamin D (VITAMIN D3) 1000 units Tab, Take 1,000 Units by mouth once daily., Disp: , Rfl:   ·  zinc gluconate 50 mg tablet, Take 50 mg by mouth once daily., Disp: , Rfl:   ·  aspirin (ECOTRIN) 81 MG EC tablet, Take 1 tablet (81 mg total) by mouth once daily., Disp: 90 tablet, Rfl: 3          Review of Systems   Constitutional:  Negative for fever, chills and fatigue.   Skin:  Positive for activity-related sunscreen use and wears hat. Negative for daily sunscreen use.       Objective:   Physical Exam   Constitutional: He appears well-developed and well-nourished. No distress.   Neurological: He is alert and oriented to person, place, and time. He is not disoriented.   Psychiatric: He has a normal mood and affect.   Skin:   Areas Examined (abnormalities noted in diagram):    Scalp / Hair Palpated and Inspected  Head / Face Inspection Performed  Neck Inspection Performed                 Diagram Legend     Erythematous scaling macule/papule c/w actinic keratosis       Vascular papule c/w angioma      Pigmented verrucoid papule/plaque c/w seborrheic keratosis      Yellow umbilicated papule c/w sebaceous hyperplasia      Irregularly shaped tan macule c/w lentigo     1-2 mm smooth white papules consistent with Milia      Movable subcutaneous cyst with punctum c/w epidermal inclusion cyst      Subcutaneous movable cyst c/w pilar cyst      Firm pink to brown papule c/w dermatofibroma      Pedunculated fleshy papule(s) c/w skin tag(s)      Evenly pigmented macule c/w junctional nevus     Mildly variegated pigmented, slightly irregular-bordered macule c/w mildly atypical nevus      Flesh colored to evenly pigmented papule c/w intradermal nevus       Pink pearly papule/plaque c/w basal cell carcinoma      Erythematous hyperkeratotic cursted plaque c/w SCC      Surgical scar with no sign of skin cancer recurrence      Open and closed comedones      Inflammatory papules and pustules      Verrucoid papule consistent consistent with wart     Erythematous eczematous patches and plaques     Dystrophic onycholytic nail with subungual debris c/w onychomycosis     Umbilicated papule    Erythematous-base heme-crusted tan verrucoid plaque consistent with inflamed seborrheic keratosis     Erythematous Silvery Scaling Plaque c/w Psoriasis     See annotation      Assessment / Plan:      Pathology Orders:       Normal Orders This Visit    Specimen to Pathology, Dermatology     Comments:    Number of Specimens:->1  ------------------------->-------------------------  Spec 1 Procedure:->Biopsy  Spec 1 Clinical Impression:->BCC vs other  Spec 1 Source:->R lower cheek    Questions:    Procedure Type: Dermatology and skin neoplasms    Number of Specimens: 1    ------------------------: -------------------------     Spec 1 Procedure: Biopsy    Spec 1 Clinical Impression: BCC vs other    Spec 1 Source: R lower cheek    Release to patient:           Neoplasm of uncertain behavior of skin  -     Specimen to Pathology, Dermatology  Shave biopsy procedure note:    Shave biopsy performed after verbal consent including risk of infection, scar, recurrence, need for additional treatment of site. Area prepped with alcohol, anesthetized with approximately 1.0cc of 1% lidocaine with epinephrine. Lesional tissue shaved with razor blade. Hemostasis achieved with application of aluminum chloride followed by hyfrecation. No complications. Dressing applied. Wound care explained.    Actinic keratoses  Cryosurgery Procedure Note    Verbal consent from the patient is obtained and the patient is aware of the precancerous quality and need for treatment of these lesions. Liquid nitrogen cryosurgery is applied to the 4 actinic keratoses, as detailed in the physical exam, to produce a freeze injury. The patient is aware that blisters may form and is instructed on wound care with gentle cleansing and use of vaseline ointment to keep moist until healed. The patient is supplied a handout on cryosurgery and is instructed to call if lesions do not completely resolve.    Seborrheic keratoses  These are benign inherited growths without a malignant potential. Reassurance given to patient. No treatment is necessary.     Encounter for follow-up surveillance of skin cancer  Area of previous NMSC (left helix) examined. Site well healed with no signs of recurrence.  Head and neck skin examination (per patient's preference) performed today as noted in physical examination. 1 lesion suspicious for malignancy noted.    Patient instructed in importance in daily broad spectrum sun protection of at least spf 30. Mineral sunscreen ingredients preferred (Zinc +/- Titanium) and can be found OTC.   Recommend Elta MD for daily use on face and neck.  Patient encouraged to wear  hat for all outdoor exposure.   Also discussed sun avoidance and use of protective clothing.               Follow up in about 6 months (around 9/12/2024).

## 2024-03-12 NOTE — PATIENT INSTRUCTIONS
Shave Biopsy Wound Care    Your doctor has performed a shave biopsy today.  A band aid and vaseline ointment has been placed over the site.  This should remain in place for 24 hours.  It is recommended that you keep the area dry for the first 24 hours.  After 24 hours, you may remove the band aid and wash the area with warm soap and water and apply Vaseline jelly.  Many patients prefer to use Neosporin or Bacitracin ointment.  This is acceptable; however, know that you can develop an allergy to this medication even if you have used it safely for years.  It is important to keep the area moist.  Letting it dry out and get air slows healing time, and will worsen the scar.  Band aid is optional after first 24 hours.      If you notice increasing redness, tenderness, pain, or yellow drainage at the biopsy site, please notify your doctor.  These are signs of an infection.    If your biopsy site is bleeding, apply firm pressure for 15 minutes straight.  Repeat for another 15 minutes, if it is still bleeding.   If the surgical site continues to bleed, then please contact your doctor.       [facility] - DERMATOLOGY  [address], [address]  [place] [address]  Dept: [phone]  Dept Fax: [phone]      CRYOSURGERY      Your doctor has used a method called cryosurgery to treat your skin condition. Cryosurgery refers to the use of very cold substances to treat a variety of skin conditions such as warts, pre-skin cancers, molluscum contagiosum, sun spots, and several benign growths. The substance we use in cryosurgery is liquid nitrogen and is so cold (-195 degrees Celsius) that is burns when administered.     Following treatment in the office, the skin may immediately burn and become red. You may find the area around the lesion is affected as well. It is sometimes necessary to treat not only the lesion, but a small area of the surrounding normal skin to achieve a good response.     A  blister, and even a blood filled blister, may form after treatment.   This is a normal response. If the blister is painful, it is acceptable to sterilize a needle and with rubbing alcohol and gently pop the blister. It is important that you gently wash the area with soap and warm water as the blister fluid may contain wart virus if a wart was treated. Do no remove the roof of the blister.     The area treated can take anywhere from 1-3 weeks to heal. Healing time depends on the kind skin lesion treated, the location, and how aggressively the lesion was treated. It is recommended that the areas treated are covered with Vaseline or bacitracin ointment and a band-aid. If a band-aid is not practical, just ointment applied several times per day will do. Keeping these areas moist will speed the healing time.    Treatment with liquid nitrogen can leave a scar. In dark skin, it may be a light or dark scar, in light skin it may be a white or pink scar. These will generally fade with time, but may never go away completely.     If you have any concerns after your treatment, please feel free to call the office.         [facility] - DERMATOLOGY  [address], [address]  [place] [address]  Dept: [phone]  Dept Fax: [phone]

## 2024-03-15 LAB
FINAL PATHOLOGIC DIAGNOSIS: NORMAL
GROSS: NORMAL
Lab: NORMAL
MICROSCOPIC EXAM: NORMAL

## 2024-03-18 ENCOUNTER — TELEPHONE (OUTPATIENT)
Dept: DERMATOLOGY | Facility: CLINIC | Age: 79
End: 2024-03-18
Payer: MEDICARE

## 2024-03-18 DIAGNOSIS — C44.319 BASAL CELL CARCINOMA (BCC) OF RIGHT CHEEK: Primary | ICD-10-CM

## 2024-03-20 DIAGNOSIS — C44.319 BASAL CELL CARCINOMA OF CHEEK: Primary | ICD-10-CM

## 2024-03-21 ENCOUNTER — TELEPHONE (OUTPATIENT)
Dept: DERMATOLOGY | Facility: CLINIC | Age: 79
End: 2024-03-21
Payer: MEDICARE

## 2024-03-21 NOTE — TELEPHONE ENCOUNTER
----- Message from Trent Su sent at 3/21/2024 11:19 AM CDT -----  Type:  Patient Returning Call    Who Called:  pts spouse   Who Left Message for Patient:  NA   Does the patient know what this is regarding?:  yes  Best Call Back Number:  [phone]    Additional Information:  pts spouse stated they were advised a time and date to vanessa appt and due to vanessa conflict pt is not able to attend that offered and is asking for call back to advise and vanessa asap, please call back to advise and vanessa asap thanks!

## 2024-03-21 NOTE — TELEPHONE ENCOUNTER
Spoke with pt regarding mohs procedure. Pt verbalized understanding of procedure and had no further concerns or questions. Appt scheduled.

## 2024-04-03 ENCOUNTER — PROCEDURE VISIT (OUTPATIENT)
Dept: DERMATOLOGY | Facility: CLINIC | Age: 79
End: 2024-04-03
Payer: MEDICARE

## 2024-04-03 VITALS
WEIGHT: 178.56 LBS | BODY MASS INDEX: 26.45 KG/M2 | HEIGHT: 69 IN | SYSTOLIC BLOOD PRESSURE: 133 MMHG | DIASTOLIC BLOOD PRESSURE: 71 MMHG | HEART RATE: 48 BPM

## 2024-04-03 DIAGNOSIS — C44.319 BASAL CELL CARCINOMA OF CHEEK: ICD-10-CM

## 2024-04-03 PROCEDURE — 13132 CMPLX RPR F/C/C/M/N/AX/G/H/F: CPT | Mod: S$PBB,51,, | Performed by: DERMATOLOGY

## 2024-04-03 PROCEDURE — 17312 MOHS ADDL STAGE: CPT | Mod: PBBFAC,PN | Performed by: DERMATOLOGY

## 2024-04-03 PROCEDURE — 17311 MOHS 1 STAGE H/N/HF/G: CPT | Mod: S$PBB,,, | Performed by: DERMATOLOGY

## 2024-04-03 PROCEDURE — 17312 MOHS ADDL STAGE: CPT | Mod: S$PBB,,, | Performed by: DERMATOLOGY

## 2024-04-03 PROCEDURE — 13132 CMPLX RPR F/C/C/M/N/AX/G/H/F: CPT | Mod: PBBFAC,PN | Performed by: DERMATOLOGY

## 2024-04-03 PROCEDURE — 17311 MOHS 1 STAGE H/N/HF/G: CPT | Mod: PBBFAC,PN | Performed by: DERMATOLOGY

## 2024-04-03 NOTE — PROGRESS NOTES
MOHS MICROGRAPHIC SURGERY OPERATIVE NOTE  Name:  Norris Nelson  Date: 4/3/2024  Patient : 1945  Attending Surgeon: Carlos Briscoe MD  Assistants: Bel Giraldo - Surgical Technician  Anesthetic Agent: 1% lidocaine with 1:100,000 epinephrine  Clinical Diagnosis: basal cell carcinoma nodular and superficial  Operation: Mohs Micrographic Surgery  Location: right lower cheek  Indications: Location in mask areas of face including central face, nose, eyelids, eyebrows, lips, chin, preauricular, temple, and ear.  Surgical Preparation: povidone-iodine     Description of Operation:  The nature and purpose of the procedure, associated risks and alternative treatments were explained to the patient in detail. All patient questions were answered completely. An informed operative consent and photography permit were obtained. The tumor location was then identified and marked with agreement by the patient of the correct location. The patient was positioned, prepped, and draped in the usual sterile manner. Local anesthesia was obtained with 3 cc(s) of 1% lidocaine with 1:100,000 epinephrine. The lesion pre-operatively measures 0.9 by 0.6cm.     1ST STAGE:  A 2+ mm rim of normal appearing skin was marked circumferentially around the lesion after scraping with a curette to define the margin. The area thus outlined was excised at a 45 degree angle. Hemostasis was obtained with electrodesiccation. The specimen was oriented, mapped, and subdivided into at least two sections. Sections were then chromacoded and submitted for horizontal frozen sections. The patient tolerated the procedure well and there were no complications. Upon microscopic examination of the processed horizontal frozen sections of this stage:  Tumor was present at the margin of the specimen; these areas of residual tumor were marked on the reference map with red pencil, pinpointing the location in which further tissue excision was necessary.  Tumor  type noted on stage 1: Nodular basal cell carcinoma: Nodular tumor in dermis composed of basaloid cells exhibiting peripheral palisading and retraction artifact.     SUBSEQUENT STAGES:  The patient returned to the operating room for additional stages of tumor removal, and prepped in the manner described above. Surgery was directed to the areas having residual tumor, with thin layers of tissue being excised from these regions. A new reference map was prepared during the surgery to maintain precise orientation as described above. Hemostasis was achieved and the excised tissue was processed for microscopic analysis.  These sections were then examined by the Mohs surgeon, and areas of persistent tumor were indicated on the reference map. This process was repeated until the frozen horizontal sections of STAGE 2 revealed no further tumor cells and tumor eradication was considered to be  complete.  Tumor type noted on subsequent stages: No tumor seen.     SUMMARY:  The tumor was extirpated in 2 Mohs Stages resulting in a final defect measuring 1.5 by 1.2 cm².   The final defect extended deep to subcutaneous fat  The patient tolerated the procedure well and no complications were noted.     PHOTOS:        Carlos Briscoe MD  Complex Linear Closure Operative Note  Name: Norris Nelson  YOB: 1945  Date: 4/3/2024  Attending Surgeon: Carlos Briscoe MD FAAD  Assistants:  Bel Giraldo - Surgical Technician  Clinical Diagnosis: A 1.5 by 1.2 cm defect secondary to Mohs Micrographic Surgery  Location: right lower cheek  Operation: Complex linear closure  Surgical Preparation: broad scrub with povidone-iodine    Description of Operation:  The nature and purpose of the procedure, associated risks and alternative treatments were explained to the patient in detail. All patient questions were answered completely. In order to minimize tension on the closure and avoid compromising the anatomic contour of the  cosmetic region and maximize functional capacity, a complex linear closure was performed. An informed operative consent and photography permit were obtained. The patient was positioned, prepped, and draped in the usual sterile manner. Local anesthesia was obtained with 7 cc(s) of 1% lidocaine with 1:100,000 epinephrine.    The defect edges were debeveled with a scalpel blade. The circular defect was widely undermined in the deep subcutaneous plane at least 100% of the defect diameter to allow for maximum tissue movement. Hemostasis was achieved with spot electrodessication. The defect was closed first utilizing multiple 4-0 Vicryl interrupted buried subcutaneous sutures. This resulted in excellent apposition of the dermis and epidermis, however two redundant areas of tissue were created on opposite sides of the defect. Utilizing a #15 scalpel blade, two Burows triangles were excised to ensure a flat scar. Hemostasis was obtained with spot electrodessication. The skin edges throughout further fastened superficially with 5-0 Nylon. The final length of the repair was 4.7 cm. The patient tolerated the procedure well and there were no complications.    Polysporin, non-adherent gauze and a pressure dressing were applied to wound after gentle cleansing with saline.    Patient instructed in and provided with instructions for post-op care, will RTC in 7 days for suture removal    Post op meds: None    Photos:        MD ESMER Capellan

## 2024-04-09 ENCOUNTER — TELEPHONE (OUTPATIENT)
Dept: DERMATOLOGY | Facility: CLINIC | Age: 79
End: 2024-04-09
Payer: MEDICARE

## 2024-04-09 NOTE — TELEPHONE ENCOUNTER
----- Message from Harriet Jesus sent at 4/9/2024 12:48 PM CDT -----  Contact: Pt Wife  Type:  Appointment Reschedule Request    Name of Caller: Pt Wife Soledad  When is the first available appointment? N/A  Symptoms: Need stitches removed   Best Call Back Number: Pt Thierry Rowe, [phone]  Please call to advise/schedule... Thank you...

## 2024-04-11 ENCOUNTER — CLINICAL SUPPORT (OUTPATIENT)
Dept: DERMATOLOGY | Facility: CLINIC | Age: 79
End: 2024-04-11
Payer: MEDICARE

## 2024-04-11 DIAGNOSIS — Z48.02 VISIT FOR SUTURE REMOVAL: Primary | ICD-10-CM

## 2024-04-11 PROCEDURE — 99024 POSTOP FOLLOW-UP VISIT: CPT | Mod: POP,,, | Performed by: DERMATOLOGY

## 2024-04-11 NOTE — PROGRESS NOTES
Mohs Surgery Suture Removal Note   Patient Name: Norris Nelson  Patient : 1945  Date of Service: 2024    CC: Pt. Presents for removal of sutures on the right lower cheek today  Subjective: patient reports wound healing as expected     Objective: Wound well healed, sutures clean/dry/intact. No evidence of any complications noted.     Visit For Suture Removal:  - Suture removal today  - Steri strips/mastisol were not applied  - Patient counseled on silicone gel/silicone sheeting and their use in the management of post-operative scars  - Handout on silicone gel/silicone gel sheeting was provided  - Patient to follow up with their referring provider in 3 months for further skin evaluation    Silvana Omalley

## 2024-04-23 ENCOUNTER — PATIENT MESSAGE (OUTPATIENT)
Dept: FAMILY MEDICINE | Facility: CLINIC | Age: 79
End: 2024-04-23
Payer: MEDICARE

## 2024-05-22 ENCOUNTER — TELEPHONE (OUTPATIENT)
Dept: FAMILY MEDICINE | Facility: CLINIC | Age: 79
End: 2024-05-22
Payer: MEDICARE

## 2024-05-22 ENCOUNTER — PATIENT MESSAGE (OUTPATIENT)
Dept: ADMINISTRATIVE | Facility: HOSPITAL | Age: 79
End: 2024-05-22
Payer: MEDICARE

## 2024-05-23 ENCOUNTER — OFFICE VISIT (OUTPATIENT)
Dept: FAMILY MEDICINE | Facility: CLINIC | Age: 79
End: 2024-05-23
Payer: MEDICARE

## 2024-05-23 ENCOUNTER — TELEPHONE (OUTPATIENT)
Dept: FAMILY MEDICINE | Facility: CLINIC | Age: 79
End: 2024-05-23

## 2024-05-23 VITALS
SYSTOLIC BLOOD PRESSURE: 106 MMHG | DIASTOLIC BLOOD PRESSURE: 52 MMHG | RESPIRATION RATE: 14 BRPM | HEIGHT: 69 IN | WEIGHT: 181 LBS | HEART RATE: 53 BPM | BODY MASS INDEX: 26.81 KG/M2 | OXYGEN SATURATION: 96 %

## 2024-05-23 DIAGNOSIS — R11.0 NAUSEA: ICD-10-CM

## 2024-05-23 DIAGNOSIS — R10.13 EPIGASTRIC PAIN: Primary | ICD-10-CM

## 2024-05-23 PROCEDURE — 99999 PR PBB SHADOW E&M-EST. PATIENT-LVL III: CPT | Mod: PBBFAC,,, | Performed by: FAMILY MEDICINE

## 2024-05-23 PROCEDURE — 99213 OFFICE O/P EST LOW 20 MIN: CPT | Mod: PBBFAC | Performed by: FAMILY MEDICINE

## 2024-05-23 PROCEDURE — 99214 OFFICE O/P EST MOD 30 MIN: CPT | Mod: S$PBB,,, | Performed by: FAMILY MEDICINE

## 2024-05-23 NOTE — TELEPHONE ENCOUNTER
Attempted to contact patient about 2 week folllow up appointment with dr spicer scheduled for June 6 2024 @ 2 pm. Patient does have access to the portal.

## 2024-05-23 NOTE — PROGRESS NOTES
Subjective:       Patient ID: Norris Nelson is a 79 y.o. male.    Chief Complaint: Fatigue (X several months) and Abdominal Pain (X several months)      Past Medical History:   Diagnosis Date    Carotid stenosis, left 2015    mild    Cyst of left kidney     7.4 cm    Hyperlipidemia     Hypertension 2009    Kidney stones     3 events    Kidney stones 2018    Prostate cancer        Past Surgical History:   Procedure Laterality Date    COLONOSCOPY      normal     CYST REMOVAL      TESTICLE    PROSTATE SURGERY      CANCER REMOVAL    TONSILLECTOMY  1950        Social History     Socioeconomic History    Marital status:     Number of children: 3    Highest education level: Master's degree (e.g., MA, MS, Vanessa, MEd, MSW, NELDA)   Occupational History    Occupation: [employer]   Tobacco Use    Smoking status: Former     Current packs/day: 0.00     Types: Cigarettes     Quit date:      Years since quittin.4    Smokeless tobacco: Never   Substance and Sexual Activity    Alcohol use: Yes     Comment: Occasionally    Drug use: Never    Sexual activity: Yes     Partners: Female       Family History   Problem Relation Name Age of Onset    Stroke Mother      Early death Mother          39y/o smoker     Heart attack Father          67 y/o    Obesity Sister      Heart attack Brother      Pacemaker/defibrilator Brother      Melanoma Neg Hx         Review of patient's allergies indicates:  No Known Allergies       Current Outpatient Medications:     amLODIPine (NORVASC) 5 MG tablet, TAKE 1 TABLET ONE TIME DAILY, Disp: 90 tablet, Rfl: 3    aspirin (ECOTRIN) 81 MG EC tablet, Take 1 tablet (81 mg total) by mouth once daily., Disp: 90 tablet, Rfl: 3    atorvastatin (LIPITOR) 40 MG tablet, Take 1 tablet (40 mg total) by mouth every evening., Disp: 90 tablet, Rfl: 1    losartan-hydrochlorothiazide 100-25 mg (HYZAAR) 100-25 mg per tablet, Take 1 tablet by mouth once daily.,  Disp: 90 tablet, Rfl: 3    vitamin D (VITAMIN D3) 1000 units Tab, Take 1,000 Units by mouth once daily., Disp: , Rfl:     zinc gluconate 50 mg tablet, Take 50 mg by mouth once daily., Disp: , Rfl:     Mr Nelson is an 78 yo male here with sporadic nausea, excessive fatigue and a gnawing at the stomach that sometimes improves with food,     Fatigue  Associated symptoms include abdominal pain and fatigue.   Abdominal Pain      Review of Systems   Constitutional:  Positive for fatigue.   Cardiovascular:         Low normal BP   Gastrointestinal:  Positive for abdominal pain.       Objective:      Physical Exam  Constitutional:       Appearance: Normal appearance.   HENT:      Right Ear: Tympanic membrane normal.      Left Ear: Tympanic membrane normal.   Eyes:      Extraocular Movements: Extraocular movements intact.      Pupils: Pupils are equal, round, and reactive to light.   Cardiovascular:      Rate and Rhythm: Bradycardia present.   Pulmonary:      Effort: Pulmonary effort is normal.      Breath sounds: Normal breath sounds.   Abdominal:      Tenderness: There is abdominal tenderness.      Comments: Gnawing feeling in the abdomen   Skin:     General: Skin is warm.   Neurological:      General: No focal deficit present.      Mental Status: He is alert and oriented to person, place, and time.   Psychiatric:         Mood and Affect: Mood normal.         Behavior: Behavior normal.         Assessment:       1. Epigastric pain    2. Nausea        Plan:         Epigastric pain  -     H.Pylori Antibody IgG; Future; Expected date: 05/23/2024  -     CBC auto differential; Future; Expected date: 05/23/2024  -     US Abdomen Complete; Future; Expected date: 05/23/2024  -     Urinalysis, Reflex to Urine Culture; Future; Expected date: 05/23/2024    Nausea  -     H.Pylori Antibody IgG; Future; Expected date: 05/23/2024  -     CBC auto differential; Future; Expected date: 05/23/2024  -     US Abdomen Complete; Future; Expected  date: 05/23/2024  -     Urinalysis, Reflex to Urine Culture; Future; Expected date: 05/23/2024        Risks, benefits, and side effects were discussed with the patient. All questions were answered to the fullest satisfaction of the patient, and pt verbalized understanding and agreement to treatment plan. Pt was to call with any new or worsening symptoms, or present to the ER.        Lucina Talley MD

## 2024-05-24 ENCOUNTER — HOSPITAL ENCOUNTER (OUTPATIENT)
Dept: RADIOLOGY | Facility: HOSPITAL | Age: 79
Discharge: HOME | End: 2024-05-24
Attending: FAMILY MEDICINE
Payer: MEDICARE

## 2024-05-24 DIAGNOSIS — R10.13 EPIGASTRIC PAIN: ICD-10-CM

## 2024-05-24 DIAGNOSIS — R11.0 NAUSEA: ICD-10-CM

## 2024-05-24 PROCEDURE — 76700 US EXAM ABDOM COMPLETE: CPT | Mod: 26,,, | Performed by: RADIOLOGY

## 2024-05-24 PROCEDURE — 76700 US EXAM ABDOM COMPLETE: CPT | Mod: TC

## 2024-05-27 ENCOUNTER — TELEPHONE (OUTPATIENT)
Dept: FAMILY MEDICINE | Facility: CLINIC | Age: 79
End: 2024-05-27
Payer: MEDICARE

## 2024-05-28 ENCOUNTER — TELEPHONE (OUTPATIENT)
Dept: FAMILY MEDICINE | Facility: CLINIC | Age: 79
End: 2024-05-28
Payer: MEDICARE

## 2024-05-28 NOTE — TELEPHONE ENCOUNTER
----- Message from Lucina Talley MD sent at 5/28/2024  1:20 PM CDT -----  Your hemoglobin and hematocrit show a you that you are anemic.  You are a good bit lower than you were 5 months ago.  We need to schedule you with a GI specialist if you do not have 1.  Please let me know if I can do that for you

## 2024-05-28 NOTE — TELEPHONE ENCOUNTER
Patient returned call and I was able to inform him of lab results. Patient does not have a GI specialist, but would like a referral to Dr Mckeon.

## 2024-05-29 DIAGNOSIS — D50.0 IRON DEFICIENCY ANEMIA DUE TO CHRONIC BLOOD LOSS: Primary | ICD-10-CM

## 2024-06-03 ENCOUNTER — TELEPHONE (OUTPATIENT)
Dept: FAMILY MEDICINE | Facility: CLINIC | Age: 79
End: 2024-06-03
Payer: MEDICARE

## 2024-06-03 NOTE — TELEPHONE ENCOUNTER
Spoke with pt in regards to Endo referral. Informed pt referral faxed to Dr. Mckeon. Pt voiced understanding

## 2024-06-03 NOTE — TELEPHONE ENCOUNTER
----- Message from Adrienne Tarango sent at 6/3/2024 11:11 AM CDT -----  Type: Needs Medical Advice  Who Called:  pt  Best Call Back Number: [phone]    Additional Information: Pt is calling the office asking to speak with the nurse.please call back an d advise.

## 2024-06-06 ENCOUNTER — LAB VISIT (OUTPATIENT)
Dept: LAB | Facility: HOSPITAL | Age: 79
End: 2024-06-06
Attending: FAMILY MEDICINE
Payer: MEDICARE

## 2024-06-06 ENCOUNTER — OFFICE VISIT (OUTPATIENT)
Dept: FAMILY MEDICINE | Facility: CLINIC | Age: 79
End: 2024-06-06
Payer: MEDICARE

## 2024-06-06 VITALS
BODY MASS INDEX: 26.9 KG/M2 | WEIGHT: 181.63 LBS | SYSTOLIC BLOOD PRESSURE: 118 MMHG | RESPIRATION RATE: 14 BRPM | HEART RATE: 47 BPM | HEIGHT: 69 IN | DIASTOLIC BLOOD PRESSURE: 62 MMHG | OXYGEN SATURATION: 98 %

## 2024-06-06 DIAGNOSIS — R53.83 FATIGUE, UNSPECIFIED TYPE: Primary | ICD-10-CM

## 2024-06-06 DIAGNOSIS — D50.8 OTHER IRON DEFICIENCY ANEMIA: ICD-10-CM

## 2024-06-06 DIAGNOSIS — D52.8 OTHER FOLATE DEFICIENCY ANEMIAS: ICD-10-CM

## 2024-06-06 DIAGNOSIS — D52.0 DIETARY FOLATE DEFICIENCY ANEMIA: ICD-10-CM

## 2024-06-06 DIAGNOSIS — R53.83 FATIGUE, UNSPECIFIED TYPE: ICD-10-CM

## 2024-06-06 LAB
ALBUMIN SERPL BCP-MCNC: 3.7 G/DL (ref 3.5–5.2)
ALP SERPL-CCNC: 78 U/L (ref 55–135)
ALT SERPL W/O P-5'-P-CCNC: 12 U/L (ref 10–44)
ANION GAP SERPL CALC-SCNC: 10 MMOL/L (ref 8–16)
AST SERPL-CCNC: 21 U/L (ref 10–40)
BASOPHILS # BLD AUTO: 0.04 K/UL (ref 0–0.2)
BASOPHILS NFR BLD: 0.9 % (ref 0–1.9)
BILIRUB SERPL-MCNC: 0.4 MG/DL (ref 0.1–1)
BUN SERPL-MCNC: 30 MG/DL (ref 8–23)
CALCIUM SERPL-MCNC: 9.8 MG/DL (ref 8.7–10.5)
CHLORIDE SERPL-SCNC: 105 MMOL/L (ref 95–110)
CO2 SERPL-SCNC: 27 MMOL/L (ref 23–29)
CREAT SERPL-MCNC: 1.5 MG/DL (ref 0.5–1.4)
DIFFERENTIAL METHOD BLD: ABNORMAL
EOSINOPHIL # BLD AUTO: 0.2 K/UL (ref 0–0.5)
EOSINOPHIL NFR BLD: 3.3 % (ref 0–8)
ERYTHROCYTE [DISTWIDTH] IN BLOOD BY AUTOMATED COUNT: 12.6 % (ref 11.5–14.5)
EST. GFR  (NO RACE VARIABLE): 46.5 ML/MIN/1.73 M^2
GLUCOSE SERPL-MCNC: 101 MG/DL (ref 70–110)
HCT VFR BLD AUTO: 33.7 % (ref 40–54)
HGB BLD-MCNC: 11.1 G/DL (ref 14–18)
IMM GRANULOCYTES # BLD AUTO: 0.01 K/UL (ref 0–0.04)
IMM GRANULOCYTES NFR BLD AUTO: 0.2 % (ref 0–0.5)
IRON SERPL-MCNC: 80 UG/DL (ref 45–160)
LYMPHOCYTES # BLD AUTO: 1.1 K/UL (ref 1–4.8)
LYMPHOCYTES NFR BLD: 24.1 % (ref 18–48)
MCH RBC QN AUTO: 31.7 PG (ref 27–31)
MCHC RBC AUTO-ENTMCNC: 32.9 G/DL (ref 32–36)
MCV RBC AUTO: 96 FL (ref 82–98)
MONOCYTES # BLD AUTO: 0.3 K/UL (ref 0.3–1)
MONOCYTES NFR BLD: 6.8 % (ref 4–15)
NEUTROPHILS # BLD AUTO: 3 K/UL (ref 1.8–7.7)
NEUTROPHILS NFR BLD: 64.7 % (ref 38–73)
NRBC BLD-RTO: 0 /100 WBC
PLATELET # BLD AUTO: 196 K/UL (ref 150–450)
PMV BLD AUTO: 9.5 FL (ref 9.2–12.9)
POTASSIUM SERPL-SCNC: 3.8 MMOL/L (ref 3.5–5.1)
PROT SERPL-MCNC: 6.9 G/DL (ref 6–8.4)
RBC # BLD AUTO: 3.5 M/UL (ref 4.6–6.2)
SATURATED IRON: 22 % (ref 20–50)
SODIUM SERPL-SCNC: 142 MMOL/L (ref 136–145)
TOTAL IRON BINDING CAPACITY: 360 UG/DL (ref 250–450)
TRANSFERRIN SERPL-MCNC: 243 MG/DL (ref 200–375)
WBC # BLD AUTO: 4.57 K/UL (ref 3.9–12.7)

## 2024-06-06 PROCEDURE — 85025 COMPLETE CBC W/AUTO DIFF WBC: CPT | Performed by: FAMILY MEDICINE

## 2024-06-06 PROCEDURE — 82728 ASSAY OF FERRITIN: CPT | Performed by: FAMILY MEDICINE

## 2024-06-06 PROCEDURE — 36415 COLL VENOUS BLD VENIPUNCTURE: CPT | Performed by: FAMILY MEDICINE

## 2024-06-06 PROCEDURE — 99213 OFFICE O/P EST LOW 20 MIN: CPT | Mod: PBBFAC | Performed by: FAMILY MEDICINE

## 2024-06-06 PROCEDURE — 99214 OFFICE O/P EST MOD 30 MIN: CPT | Mod: S$PBB,,, | Performed by: FAMILY MEDICINE

## 2024-06-06 PROCEDURE — 99999 PR PBB SHADOW E&M-EST. PATIENT-LVL III: CPT | Mod: PBBFAC,,, | Performed by: FAMILY MEDICINE

## 2024-06-06 PROCEDURE — 82746 ASSAY OF FOLIC ACID SERUM: CPT | Performed by: FAMILY MEDICINE

## 2024-06-06 PROCEDURE — 83540 ASSAY OF IRON: CPT | Performed by: FAMILY MEDICINE

## 2024-06-06 PROCEDURE — 80053 COMPREHEN METABOLIC PANEL: CPT | Performed by: FAMILY MEDICINE

## 2024-06-06 PROCEDURE — 82607 VITAMIN B-12: CPT | Performed by: FAMILY MEDICINE

## 2024-06-06 RX ORDER — FERROUS SULFATE 325(65) MG
325 TABLET ORAL
Qty: 90 TABLET | Refills: 3 | Status: SHIPPED | OUTPATIENT
Start: 2024-06-06

## 2024-06-06 NOTE — PROGRESS NOTES
Subjective:       Patient ID: Norris Nelson is a 79 y.o. male.    Chief Complaint: Follow-up (2 week)      Past Medical History:   Diagnosis Date    Carotid stenosis, left 2015    mild    Cyst of left kidney     7.4 cm    Hyperlipidemia     Hypertension     Kidney stones     3 events    Kidney stones 2018    Prostate cancer 1995       Past Surgical History:   Procedure Laterality Date    COLONOSCOPY      normal     CYST REMOVAL      TESTICLE    PROSTATE SURGERY      CANCER REMOVAL    TONSILLECTOMY  1950        Social History     Socioeconomic History    Marital status:     Number of children: 3    Highest education level: Master's degree (e.g., MA, MS, Vanessa, MEd, MSW, NELDA)   Occupational History    Occupation: Retired-[employer] worker   Tobacco Use    Smoking status: Former     Current packs/day: 0.00     Types: Cigarettes     Quit date:      Years since quittin.4    Smokeless tobacco: Never   Substance and Sexual Activity    Alcohol use: Yes     Comment: Occasionally    Drug use: Never    Sexual activity: Yes     Partners: Female       Family History   Problem Relation Name Age of Onset    Stroke Mother      Early death Mother          41y/o smoker     Heart attack Father          69 y/o    Obesity Sister      Heart attack Brother      Pacemaker/defibrilator Brother      Melanoma Neg Hx         Review of patient's allergies indicates:  No Known Allergies       Current Outpatient Medications:     amLODIPine (NORVASC) 5 MG tablet, TAKE 1 TABLET ONE TIME DAILY, Disp: 90 tablet, Rfl: 3    aspirin (ECOTRIN) 81 MG EC tablet, Take 1 tablet (81 mg total) by mouth once daily., Disp: 90 tablet, Rfl: 3    atorvastatin (LIPITOR) 40 MG tablet, Take 1 tablet (40 mg total) by mouth every evening., Disp: 90 tablet, Rfl: 1    losartan-hydrochlorothiazide 100-25 mg (HYZAAR) 100-25 mg per tablet, Take 1 tablet by mouth once daily., Disp: 90 tablet, Rfl: 3    vitamin D (VITAMIN  D3) 1000 units Tab, Take 1,000 Units by mouth once daily., Disp: , Rfl:     zinc gluconate 50 mg tablet, Take 50 mg by mouth once daily., Disp: , Rfl:     ferrous sulfate (IRON) 325 mg (65 mg iron) Tab tablet, Take 1 tablet (325 mg total) by mouth daily with breakfast., Disp: 90 tablet, Rfl: 3    MR Nelson is an 80 yo male here to be reassessed for his anemia. He was found to be anemia approx 2 weeks ago and was referred to GI. He will see Dr Mckeon in aprox 2 weeks. Mr Nelson did tell me today, that he had donated 1 unit of blood apprx 2 weeks prior to my office visit with him. But his HCT dropped 9 points, so I feel he has more than just donation issues affecting the Hgb and Hct    Follow-up  Pertinent negatives include no abdominal pain, chest pain, chills, congestion, coughing, diaphoresis, fever, nausea, rash or sore throat.     Review of Systems   Constitutional:  Negative for activity change, appetite change, chills, diaphoresis and fever.   HENT:  Negative for congestion, ear pain, postnasal drip, rhinorrhea and sore throat.    Eyes:  Negative for pain, discharge, redness and itching.   Respiratory:  Negative for cough and shortness of breath.    Cardiovascular:  Negative for chest pain, palpitations and leg swelling.   Gastrointestinal:  Negative for abdominal distention, abdominal pain, constipation, diarrhea and nausea.   Genitourinary:  Negative for difficulty urinating, dysuria, frequency and urgency.   Skin:  Negative for color change, rash and wound.   All other systems reviewed and are negative.      Objective:      Physical Exam  Vitals and nursing note reviewed.   Constitutional:       Appearance: Normal appearance. He is normal weight.   HENT:      Head: Normocephalic.      Nose: Nose normal.   Eyes:      Extraocular Movements: Extraocular movements intact.      Conjunctiva/sclera: Conjunctivae normal.      Pupils: Pupils are equal, round, and reactive to light.   Cardiovascular:      Rate and  Rhythm: Regular rhythm. Bradycardia present.      Heart sounds: Normal heart sounds. No murmur heard.  Pulmonary:      Effort: Pulmonary effort is normal. No respiratory distress.      Breath sounds: Normal breath sounds.   Musculoskeletal:         General: Normal range of motion.      Cervical back: Normal range of motion and neck supple.   Skin:     General: Skin is warm and dry.   Neurological:      General: No focal deficit present.      Mental Status: He is alert and oriented to person, place, and time.   Psychiatric:         Mood and Affect: Mood normal.         Behavior: Behavior normal.         Assessment:       1. Fatigue, unspecified type    2. Other iron deficiency anemia    3. Dietary folate deficiency anemia    4. Other folate deficiency anemias        Plan:         Fatigue, unspecified type  -     CBC auto differential; Future; Expected date: 06/06/2024  -     Comprehensive metabolic panel; Future; Expected date: 06/06/2024  -     ferrous sulfate (IRON) 325 mg (65 mg iron) Tab tablet; Take 1 tablet (325 mg total) by mouth daily with breakfast.  Dispense: 90 tablet; Refill: 3    Other iron deficiency anemia  -     ferrous sulfate (IRON) 325 mg (65 mg iron) Tab tablet; Take 1 tablet (325 mg total) by mouth daily with breakfast.  Dispense: 90 tablet; Refill: 3  -     Ferritin; Future; Expected date: 06/06/2024  -     Iron and TIBC; Future; Expected date: 06/06/2024  -     Vitamin B12; Future; Expected date: 06/06/2024  -     Folate; Future; Expected date: 06/06/2024    Dietary folate deficiency anemia  -     Vitamin B12; Future; Expected date: 06/06/2024    Other folate deficiency anemias  -     Folate; Future; Expected date: 06/06/2024        Risks, benefits, and side effects were discussed with the patient. All questions were answered to the fullest satisfaction of the patient, and pt verbalized understanding and agreement to treatment plan. Pt was to call with any new or worsening symptoms, or present  to the [facility].        Lucina Talley MD

## 2024-06-07 ENCOUNTER — TELEPHONE (OUTPATIENT)
Dept: FAMILY MEDICINE | Facility: CLINIC | Age: 79
End: 2024-06-07
Payer: MEDICARE

## 2024-06-07 LAB
FERRITIN SERPL-MCNC: 69 NG/ML (ref 20–300)
FOLATE SERPL-MCNC: 10 NG/ML (ref 4–24)
VIT B12 SERPL-MCNC: 378 PG/ML (ref 210–950)

## 2024-06-07 NOTE — TELEPHONE ENCOUNTER
----- Message from Lucina Talley MD sent at 6/7/2024  7:37 AM CDT -----  Iron studies are within acceptable range    Comprehensive metabolic panel shows slightly decreased renal function.  Please make sure drink plenty of water and avoid things that are hard on the kidney such as excessive salt and nonsteroidal anti-inflammatories (ibuprofen, naproxen etc.)    CBC shows hemoglobin and hematocrit are slightly increased from your previous but still in the anemic range

## 2024-07-30 DIAGNOSIS — Z00.00 ENCOUNTER FOR MEDICARE ANNUAL WELLNESS EXAM: ICD-10-CM

## 2024-07-31 ENCOUNTER — ANESTHESIA EVENT (OUTPATIENT)
Dept: SURGERY | Facility: HOSPITAL | Age: 79
End: 2024-07-31
Payer: MEDICARE

## 2024-07-31 NOTE — ANESTHESIA PREPROCEDURE EVALUATION
07/31/2024  Norris Nelson is a 79 y.o., male.   has a past surgical history that includes Prostate surgery (1995); Tonsillectomy (1950); Cyst Removal (1999); and Colonoscopy (2014).       Pre-op Assessment    I have reviewed the Patient Summary Reports.     I have reviewed the Nursing Notes. I have reviewed the NPO Status.   I have reviewed the Medications.     Review of Systems  Anesthesia Hx:  No problems with previous Anesthesia             Denies Family Hx of Anesthesia complications.    Denies Personal Hx of Anesthesia complications.                    Social:  Smoker       Hematology/Oncology:       -- Anemia:                                  EENT/Dental:  EENT/Dental Normal           Cardiovascular:     Hypertension    Dysrhythmias      PVD hyperlipidemia   ECG has been reviewed. 12/23- SB  1/23 Stress Echo- neg for ischemia, EF 70%, LV with concentric LVH  Mild carotid disease                         Pulmonary:  Pulmonary Normal                       Renal/:  Chronic Renal Disease, CKD renal calculi  Stage 3a CKD  History of prostate cancer             Hepatic/GI:  Hepatic/GI Normal                 Musculoskeletal:  Musculoskeletal Normal                Neurological:  Neurology Normal                                      Endocrine:  Endocrine Normal            Psych:  Psychiatric Normal                  Physical Exam  General: Well nourished, Cooperative, Alert and Oriented    Airway:  Mallampati: II   Mouth Opening: Normal  TM Distance: Normal  Tongue: Normal  Neck ROM: Normal ROM    Dental:  Intact    Chest/Lungs:  Clear to auscultation, Normal Respiratory Rate    Heart:  Rate: Normal  Rhythm: Regular Rhythm    Anesthesia Plan  Type of Anesthesia, risks & benefits discussed:    Anesthesia Type: MAC  Intra-op Monitoring Plan: Standard ASA Monitors  Post Op Pain Control Plan: IV/PO Opioids  PRN  Induction:  IV  Informed Consent: Informed consent signed with the Patient and all parties understand the risks and agree with anesthesia plan.  All questions answered.   ASA Score: 2  Day of Surgery Review of History & Physical: H&P Update referred to the surgeon/provider.    Ready For Surgery From Anesthesia Perspective.   .

## 2024-08-03 DIAGNOSIS — E78.2 MIXED HYPERLIPIDEMIA: ICD-10-CM

## 2024-08-05 RX ORDER — ATORVASTATIN CALCIUM 40 MG/1
40 TABLET, FILM COATED ORAL NIGHTLY
Qty: 90 TABLET | Refills: 3 | Status: SHIPPED | OUTPATIENT
Start: 2024-08-05

## 2024-08-16 ENCOUNTER — ANESTHESIA (OUTPATIENT)
Dept: SURGERY | Facility: HOSPITAL | Age: 79
End: 2024-08-16
Payer: MEDICARE

## 2024-08-16 ENCOUNTER — HOSPITAL ENCOUNTER (OUTPATIENT)
Facility: HOSPITAL | Age: 79
Discharge: HOME | End: 2024-08-16
Attending: INTERNAL MEDICINE | Admitting: INTERNAL MEDICINE
Payer: MEDICARE

## 2024-08-16 VITALS
RESPIRATION RATE: 16 BRPM | HEART RATE: 68 BPM | SYSTOLIC BLOOD PRESSURE: 130 MMHG | WEIGHT: 180.75 LBS | OXYGEN SATURATION: 96 % | DIASTOLIC BLOOD PRESSURE: 63 MMHG | HEIGHT: 69 IN | TEMPERATURE: 98 F | BODY MASS INDEX: 26.77 KG/M2

## 2024-08-16 PROCEDURE — C1726 CATH, BAL DIL, NON-VASCULAR: HCPCS | Performed by: INTERNAL MEDICINE

## 2024-08-16 PROCEDURE — 43239 EGD BIOPSY SINGLE/MULTIPLE: CPT | Mod: 59 | Performed by: INTERNAL MEDICINE

## 2024-08-16 PROCEDURE — 63600175 PHARM REV CODE 636 W HCPCS: Performed by: NURSE ANESTHETIST, CERTIFIED REGISTERED

## 2024-08-16 PROCEDURE — 45385 COLONOSCOPY W/LESION REMOVAL: CPT | Performed by: INTERNAL MEDICINE

## 2024-08-16 PROCEDURE — 37000008 HC ANESTHESIA 1ST 15 MINUTES: Performed by: INTERNAL MEDICINE

## 2024-08-16 PROCEDURE — 63600175 PHARM REV CODE 636 W HCPCS: Performed by: SPECIALIST

## 2024-08-16 PROCEDURE — 37000009 HC ANESTHESIA EA ADD 15 MINS: Performed by: INTERNAL MEDICINE

## 2024-08-16 PROCEDURE — 25000003 PHARM REV CODE 250: Performed by: NURSE ANESTHETIST, CERTIFIED REGISTERED

## 2024-08-16 PROCEDURE — 88305 TISSUE EXAM BY PATHOLOGIST: CPT | Performed by: PATHOLOGY

## 2024-08-16 PROCEDURE — 27201423 OPTIME MED/SURG SUP & DEVICES STERILE SUPPLY: Performed by: INTERNAL MEDICINE

## 2024-08-16 PROCEDURE — 43249 ESOPH EGD DILATION <30 MM: CPT | Performed by: INTERNAL MEDICINE

## 2024-08-16 RX ORDER — HYDROMORPHONE HYDROCHLORIDE 1 MG/ML
0.5 INJECTION, SOLUTION INTRAMUSCULAR; INTRAVENOUS; SUBCUTANEOUS EVERY 5 MIN PRN
Status: DISCONTINUED | OUTPATIENT
Start: 2024-08-16 | End: 2024-08-16 | Stop reason: HOSPADM

## 2024-08-16 RX ORDER — EPHEDRINE SULFATE 50 MG/ML
INJECTION, SOLUTION INTRAVENOUS
Status: DISCONTINUED | OUTPATIENT
Start: 2024-08-16 | End: 2024-08-16

## 2024-08-16 RX ORDER — LIDOCAINE HYDROCHLORIDE 20 MG/ML
INJECTION INTRAVENOUS
Status: DISCONTINUED | OUTPATIENT
Start: 2024-08-16 | End: 2024-08-16

## 2024-08-16 RX ORDER — SODIUM CHLORIDE, SODIUM LACTATE, POTASSIUM CHLORIDE, CALCIUM CHLORIDE 600; 310; 30; 20 MG/100ML; MG/100ML; MG/100ML; MG/100ML
125 INJECTION, SOLUTION INTRAVENOUS CONTINUOUS
Status: DISCONTINUED | OUTPATIENT
Start: 2024-08-16 | End: 2024-08-16 | Stop reason: HOSPADM

## 2024-08-16 RX ORDER — PROPOFOL 10 MG/ML
VIAL (ML) INTRAVENOUS
Status: DISCONTINUED | OUTPATIENT
Start: 2024-08-16 | End: 2024-08-16

## 2024-08-16 RX ORDER — LIDOCAINE HYDROCHLORIDE 10 MG/ML
1 INJECTION, SOLUTION EPIDURAL; INFILTRATION; INTRACAUDAL; PERINEURAL ONCE
Status: DISCONTINUED | OUTPATIENT
Start: 2024-08-16 | End: 2024-08-16 | Stop reason: HOSPADM

## 2024-08-16 RX ORDER — ONDANSETRON HYDROCHLORIDE 2 MG/ML
4 INJECTION, SOLUTION INTRAVENOUS DAILY PRN
Status: DISCONTINUED | OUTPATIENT
Start: 2024-08-16 | End: 2024-08-16 | Stop reason: HOSPADM

## 2024-08-16 RX ORDER — MEPERIDINE HYDROCHLORIDE 50 MG/ML
12.5 INJECTION INTRAMUSCULAR; INTRAVENOUS; SUBCUTANEOUS EVERY 10 MIN PRN
Status: DISCONTINUED | OUTPATIENT
Start: 2024-08-16 | End: 2024-08-16 | Stop reason: HOSPADM

## 2024-08-16 RX ORDER — SODIUM CHLORIDE, SODIUM LACTATE, POTASSIUM CHLORIDE, CALCIUM CHLORIDE 600; 310; 30; 20 MG/100ML; MG/100ML; MG/100ML; MG/100ML
INJECTION, SOLUTION INTRAVENOUS CONTINUOUS
Status: DISCONTINUED | OUTPATIENT
Start: 2024-08-16 | End: 2024-08-16 | Stop reason: HOSPADM

## 2024-08-16 RX ORDER — GLUCAGON 1 MG
1 KIT INJECTION
Status: DISCONTINUED | OUTPATIENT
Start: 2024-08-16 | End: 2024-08-16 | Stop reason: HOSPADM

## 2024-08-16 RX ORDER — OXYCODONE HYDROCHLORIDE 5 MG/1
5 TABLET ORAL ONCE AS NEEDED
Status: DISCONTINUED | OUTPATIENT
Start: 2024-08-16 | End: 2024-08-16 | Stop reason: HOSPADM

## 2024-08-16 RX ADMIN — PROPOFOL 20 MG: 10 INJECTION, EMULSION INTRAVENOUS at 10:08

## 2024-08-16 RX ADMIN — PROPOFOL 30 MG: 10 INJECTION, EMULSION INTRAVENOUS at 09:08

## 2024-08-16 RX ADMIN — EPHEDRINE SULFATE 5 MG: 50 INJECTION INTRAVENOUS at 10:08

## 2024-08-16 RX ADMIN — EPHEDRINE SULFATE 15 MG: 50 INJECTION INTRAVENOUS at 09:08

## 2024-08-16 RX ADMIN — EPHEDRINE SULFATE 10 MG: 50 INJECTION INTRAVENOUS at 10:08

## 2024-08-16 RX ADMIN — PROPOFOL 20 MG: 10 INJECTION, EMULSION INTRAVENOUS at 09:08

## 2024-08-16 RX ADMIN — SODIUM CHLORIDE, POTASSIUM CHLORIDE, SODIUM LACTATE AND CALCIUM CHLORIDE: 600; 310; 30; 20 INJECTION, SOLUTION INTRAVENOUS at 08:08

## 2024-08-16 RX ADMIN — LIDOCAINE HYDROCHLORIDE 40 MG: 20 INJECTION, SOLUTION INTRAVENOUS at 09:08

## 2024-08-16 RX ADMIN — PROPOFOL 30 MG: 10 INJECTION, EMULSION INTRAVENOUS at 10:08

## 2024-08-16 RX ADMIN — PROPOFOL 50 MG: 10 INJECTION, EMULSION INTRAVENOUS at 09:08

## 2024-08-16 RX ADMIN — EPHEDRINE SULFATE 10 MG: 50 INJECTION INTRAVENOUS at 09:08

## 2024-08-16 NOTE — H&P
H&P update:  Patient presents this morning for endoscopy as scheduled.  I have reviewed the case with the patient and there are no changes since the clinic visit.  We have discussed risk and benefit associated with endoscopy and patient wishes to proceed.  ------------------------------------------------------------------------------------------------------------    Impressions:   1. Anemia, unspecified type   2. Dyspepsia   3. Nausea   4. Bradycardia   5. Chronic kidney disease (CKD), active medical management without dialysis, stage 3 (moderate)       Recommendations:  --Colonoscopy indications: Anemia;   The patient is an appropriate candidate for Colonoscopy. I discussed with the patient the bowel prep as well as the benefits, risks, indications and, alternatives to Colonoscopy including the risks of perforation and bleeding among other risks. The patient understands and wishes to proceed.   --EGD indications: Anemia, Dyspepsia, Nausea;   The patient is an appropriate candidate for EGD with possible dilatation and biopsy. I discussed with the patient the benefits, risks, indications and, alternatives to EGD with possible dilatation and biopsy including the risks of perforation and bleeding among other risks. The patient understands and wishes to proceed.   --Trial Omeprazole 20 mg daily  --Monitor for triggering foods; avoid these when possible    --Follow up in GI Clinic in 2 weeks, s/p procedure.    Orders Placed This Encounter   Medications   omeprazole (PriLOSEC) 20 MG capsule   Sig: Take one capsule (20 mg total) by mouth every morning before breakfast   Dispense: 30 capsule   Refill: 2   polyethylene glycol (GoLYTELY) 236-22.74-6.74 -5.86 gram suspension   Sig: Use as directed for bowel prep   Dispense: 4000 mL   Refill: 1     Orders Placed This Encounter   Procedures   ASC Colonoscopy   ASC EGD     ---------------------------------------------------------------------    Chief complaint: Anemia     History  of present illness: This is a new patient, an 79-year-old male referred by his PCP, Dr. Talley, in consultation for evaluation of worsening anemia. Patient was seen for routine follow-up in May with his primary care and noted to have a drop in hemoglobin from 13.2 (12/2023) to 10.4 5/23/2024. Iron studies Denies melena, hematic emesis, hematochezia bleeding episode. He does report increased fatigue. He also is complaining of some indigestion and reflux symptoms that seem to have and over the past weeks to months. In the past he has used Tums however this does not seem to be helping. He started oral iron supplement 2 weeks ago. We discussed a trial of a low-dose PPI to see if this improves his symptoms and will proceed with upper and lower endoscopy and he agrees. He does report previous colonoscopy in 2014 with no polyps. Denies an immediate family member with history of colon cancer.   Of note, he has a history of bradycardia and is an established with cardiology, Dr. Ryan. Reviewed last office visit note and noted Holter monitor study completed for this in 2022.     Review of systems: 12 point review of systems was negative except as documented above.    Outpatient medications: Personally reviewed and documented in the EHR  Current Outpatient Medications   Medication Instructions   amLODIPine (NORVASC) 5 mg, Oral, Daily   aspirin (ECOTRIN LOW STRENGTH) 81 mg, Oral, Daily   atorvastatin (LIPITOR) 40 MG tablet 1 tablet, Sublingual, Every evening   cholecalciferol (VITAMIN D3) 1,000 Units, Oral, Daily with breakfast   ferrous sulfate 325 mg, Oral, Daily with breakfast   losartan-hydroCHLOROthiazide (HYZAAR 100-25) 100-25 mg per tablet 1 tablet, Sublingual, Daily   omeprazole (PRILOSEC) 20 mg, Oral, Every morning before breakfast   polyethylene glycol (GoLYTELY) 236-22.74-6.74 -5.86 gram suspension Use as directed for bowel prep   zinc gluconate 50 mg, Oral, Daily       Past Medical History: Personally reviewed and  documented in the EHR  Past Medical History:   Diagnosis Date   Hypertension   Kidney stones   Mixed hyperlipidemia       Family history:   Family History   Problem Relation Age of Onset   Colon cancer Neg Hx         Physical examination:   Vital Signs: Current vital signs reviewed and documented above  General Appearance: Well-appearing. Not acutely ill.  Head: Normocephalic.   Eyes: No scleral icterus. No scleral injection. No conjunctival pallor.   Lungs: Respiration rhythm and depth was normal.  Cardiac: Regular rate and bradycardic rhythm. Apical pulse 49. No murmur appreciated.  Abdomen: Abdomen was not distended. Abdominal palpation revealed no tenderness. Abdominal auscultation revealed positive bowel sounds.  Neurological: Level of consciousness was normal. Speech was normal.  Skin: General appearance was normal. Color and pigmentation were normal. No skin lesions.    Laboratory: Personally reviewed  Component  Ref Range & Units 2 wk ago   Iron  45 - 160 ug/dL 80   Transferrin  200 - 375 mg/dL 243   TIBC  250 - 450 ug/dL 360   Saturated Iron  20 - 50 % 22     Component  Ref Range & Units 2 wk ago   Ferritin  20.0 - 300.0 ng/mL 69     Component  Ref Range & Units 2 wk ago   WBC  3.90 - 12.70 K/uL 4.57   RBC  4.60 - 6.20 M/uL 3.50 Low   Hemoglobin  14.0 - 18.0 g/dL 11.1 Low   Hematocrit  40.0 - 54.0 % 33.7 Low   MCV  82 - 98 fL 96   MCH  27.0 - 31.0 pg 31.7 High   MCHC  32.0 - 36.0 g/dL 32.9   RDW  11.5 - 14.5 % 12.6   Platelets  150 - 450 K/uL 196   MPV  9.2 - 12.9 fL 9.5   Immature Granulocytes  0.0 - 0.5 % 0.2   Gran # (ANC)  1.8 - 7.7 K/uL 3.0   Immature Grans (Abs)  0.00 - 0.04 K/uL 0.01   Comment: Mild elevation in immature granulocytes is non specific and  can be seen in a variety of conditions including stress response,  acute inflammation, trauma and pregnancy. Correlation with other  laboratory and clinical findings is essential.   Lymph #  1.0 - 4.8 K/uL 1.1   Mono #  0.3 - 1.0 K/uL 0.3   Eos  "#  0.0 - 0.5 K/uL 0.2   Baso #  0.00 - 0.20 K/uL 0.04   nRBC  0 /100 WBC 0   Gran %  38.0 - 73.0 % 64.7   Lymph %  18.0 - 48.0 % 24.1   Mono %  4.0 - 15.0 % 6.8   Eosinophil %  0.0 - 8.0 % 3.3   Basophil %  0.0 - 1.9 % 0.9   Differential Method Automated     Component  Ref Range & Units 2 wk ago   Sodium  136 - 145 mmol/L 142   Potassium  3.5 - 5.1 mmol/L 3.8   Chloride  95 - 110 mmol/L 105   CO2  23 - 29 mmol/L 27   Glucose  70 - 110 mg/dL 101   BUN  8 - 23 mg/dL 30 High   Creatinine  0.5 - 1.4 mg/dL 1.5 High   Calcium  8.7 - 10.5 mg/dL 9.8   Total Protein  6.0 - 8.4 g/dL 6.9   Albumin  3.5 - 5.2 g/dL 3.7   Total Bilirubin  0.1 - 1.0 mg/dL 0.4   Comment: For infants and newborns, interpretation of results should be based  on gestational age, weight and in agreement with clinical  observations.    Premature Infant recommended reference ranges:  Up to 24 hours.............<8.0 mg/dL  Up to 48 hours............<12.0 mg/dL  3-5 days..................<15.0 mg/dL  6-29 days.................<15.0 mg/dL   Alkaline Phosphatase  55 - 135 U/L 78   AST  10 - 40 U/L 21   ALT  10 - 44 U/L 12   eGFR  >60 mL/min/1.73 m^2 46.5 Abnormal   Anion Gap  8 - 16 mmol/L 10     No results found for: "WBC", "HGB", "HCT", "MCV", "MCH", "MCHC", "RDW", "LABPLAT" No results found for: "NA", "K", "CL", "CO2", "BUN", "CRE", "GLU", "AGAP", "EGFR", "EGFRAA", "AST", "ALT", "TP", "TBIL"     Radiology: Personally reviewed    Prior Endoscopy: None on record.   --Colonoscopy, 2014: Normal, no polyps   "

## 2024-08-16 NOTE — [employer] PATIENT INSTRUCTIONS
Discharge Summary/Instructions after an Endoscopic Procedure  Patient Name: Norris Nelson  Patient MRN: [id number]  Patient YOB: 1945 Friday, August 16, 2024  Keith Mckeon MD  RESTRICTIONS:  During your procedure today, you received medications for sedation.  These   medications may affect your judgment, balance and coordination.  Therefore,   for 24 hours, you have the following restrictions:   - DO NOT drive a car, operate machinery, make legal/financial decisions,   sign important papers or drink alcohol.    ACTIVITY:  Today: no heavy lifting, straining or running due to procedural   sedation/anesthesia.  The following day: return to full activity including work.  DIET:  Eat and drink normally unless instructed otherwise.     TREATMENT FOR COMMON SIDE EFFECTS:  - Mild abdominal pain, nausea, belching, bloating or excessive gas:  rest,   eat lightly and use a heating pad.  - Sore Throat: treat with throat lozenges and/or gargle with warm salt   water.  - Because air was used during the procedure, expelling large amounts of air   from your rectum or belching is normal.  - If a bowel prep was taken, you may not have a bowel movement for 1-3 days.    This is normal.  SYMPTOMS TO WATCH FOR AND REPORT TO YOUR PHYSICIAN:  1. Abdominal pain or bloating, other than gas cramps.  2. Chest pain.  3. Back pain.  4. Signs of infection such as: chills or fever occurring within 24 hours   after the procedure.  5. Rectal bleeding, which would show as bright red, maroon, or black stools.   (A tablespoon of blood from the rectum is not serious, especially if   hemorrhoids are present.)  6. Vomiting.  7. Weakness or dizziness.  GO DIRECTLY TO THE NEAREST EMERGENCY ROOM IF YOU HAVE ANY OF THE FOLLOWING:      Difficulty breathing              Chills and/or fever over 101 F   Persistent vomiting and/or vomiting blood   Severe abdominal pain   Severe chest pain   Black, tarry stools   Bleeding- more than one  tablespoon   Any other symptom or condition that you feel may need urgent attention  Your doctor recommends these additional instructions:  If any biopsies were taken, your doctors clinic will contact you in 1 to 2   weeks with any results.  - Discharge patient to home (ambulatory).   - Patient has a contact number available for emergencies.  The signs and   symptoms of potential delayed complications were discussed with the   patient.  Return to normal activities tomorrow.  Written discharge   instructions were provided to the patient.   - Resume previous diet.   - Continue present medications.   - Return to primary care physician as previously scheduled.   - Repeat colonoscopy in 3 years for surveillance.   - Return to GI clinic as previously scheduled.   - Return to primary care physician as previously scheduled.  For questions, problems or results please call your physician - Keith Mckeon MD at Work:  [phone].  [facility] EMERGENCY ROOM PHONE NUMBER: [phone]  IF A COMPLICATION OR EMERGENCY SITUATION ARISES AND YOU ARE UNABLE TO REACH   YOUR PHYSICIAN - GO DIRECTLY TO THE EMERGENCY ROOM.  Keith Mckeon MD  8/16/2024 10:20:51 AM  This report has been verified and signed electronically.  Dear patient,  As a result of recent federal legislation (The Federal Cures Act), you may   receive lab or pathology results from your procedure in your [facility]   account before your physician is able to contact you. Your physician or   their representative will relay the results to you with their   recommendations at their soonest availability.  Thank you,  [employer]

## 2024-08-16 NOTE — ANESTHESIA POSTPROCEDURE EVALUATION
Anesthesia Post Evaluation    Patient: Norris Nelson    Procedure(s) Performed: Procedure(s) (LRB):  EGD, WITH BALLOON DILATION (N/A)  COLONOSCOPY (N/A)    Final Anesthesia Type: MAC      Patient location during evaluation: PACU  Patient participation: Yes- Able to Participate  Level of consciousness: awake and alert and oriented  Post-procedure vital signs: reviewed and stable  Pain management: adequate  Airway patency: patent    PONV status at discharge: No PONV  Anesthetic complications: no      Cardiovascular status: blood pressure returned to baseline and hemodynamically stable  Respiratory status: spontaneous ventilation and room air  Hydration status: euvolemic  Follow-up not needed.          Vitals Value Taken Time   /56 08/16/24 1036   Temp 36.5 °C (97.7 °F) 08/16/24 1020   Pulse 78 08/16/24 1039   Resp 14 08/16/24 1039   SpO2 97 % 08/16/24 1039   Vitals shown include unfiled device data.      No case tracking events are documented in the log.      Pain/Sundar Score: Sundar Score: 5 (8/16/2024 10:20 AM)

## 2024-08-16 NOTE — [employer] PATIENT INSTRUCTIONS
Discharge Summary/Instructions after an Endoscopic Procedure  Patient Name: Norris Nelson  Patient MRN: [id number]  Patient YOB: 1945 Friday, August 16, 2024  Keith Mckeon MD  RESTRICTIONS:  During your procedure today, you received medications for sedation.  These   medications may affect your judgment, balance and coordination.  Therefore,   for 24 hours, you have the following restrictions:   - DO NOT drive a car, operate machinery, make legal/financial decisions,   sign important papers or drink alcohol.    ACTIVITY:  Today: no heavy lifting, straining or running due to procedural   sedation/anesthesia.  The following day: return to full activity including work.  DIET:  Eat and drink normally unless instructed otherwise.     TREATMENT FOR COMMON SIDE EFFECTS:  - Mild abdominal pain, nausea, belching, bloating or excessive gas:  rest,   eat lightly and use a heating pad.  - Sore Throat: treat with throat lozenges and/or gargle with warm salt   water.  - Because air was used during the procedure, expelling large amounts of air   from your rectum or belching is normal.  - If a bowel prep was taken, you may not have a bowel movement for 1-3 days.    This is normal.  SYMPTOMS TO WATCH FOR AND REPORT TO YOUR PHYSICIAN:  1. Abdominal pain or bloating, other than gas cramps.  2. Chest pain.  3. Back pain.  4. Signs of infection such as: chills or fever occurring within 24 hours   after the procedure.  5. Rectal bleeding, which would show as bright red, maroon, or black stools.   (A tablespoon of blood from the rectum is not serious, especially if   hemorrhoids are present.)  6. Vomiting.  7. Weakness or dizziness.  GO DIRECTLY TO THE NEAREST EMERGENCY ROOM IF YOU HAVE ANY OF THE FOLLOWING:      Difficulty breathing              Chills and/or fever over 101 F   Persistent vomiting and/or vomiting blood   Severe abdominal pain   Severe chest pain   Black, tarry stools   Bleeding- more than one  tablespoon   Any other symptom or condition that you feel may need urgent attention  Your doctor recommends these additional instructions:  If any biopsies were taken, your doctors clinic will contact you in 1 to 2   weeks with any results.  - Return patient to endoscopy suite for ongoing care.   - Perform a colonoscopy today.   - Await pathology results.   - Repeat upper endoscopy PRN for retreatment.  For questions, problems or results please call your physician - Keith Mckeon MD at Work:  [phone].  [facility] EMERGENCY ROOM PHONE NUMBER: [phone]  IF A COMPLICATION OR EMERGENCY SITUATION ARISES AND YOU ARE UNABLE TO REACH   YOUR PHYSICIAN - GO DIRECTLY TO THE EMERGENCY ROOM.  Keith Mckeon MD  8/16/2024 10:01:56 AM  This report has been verified and signed electronically.  Dear patient,  As a result of recent federal legislation (The Federal Cures Act), you may   receive lab or pathology results from your procedure in your [facility]   account before your physician is able to contact you. Your physician or   their representative will relay the results to you with their   recommendations at their soonest availability.  Thank you,  [employer]ATION

## 2024-08-16 NOTE — TRANSFER OF CARE
"Anesthesia Transfer of Care Note    Patient: Norris Nelson    Procedure(s) Performed: Procedure(s) (LRB):  EGD, WITH BALLOON DILATION (N/A)  COLONOSCOPY (N/A)    Patient location: PACU    Anesthesia Type: general    Transport from OR: Transported from OR on room air with adequate spontaneous ventilation    Post pain: adequate analgesia    Post assessment: no apparent anesthetic complications and tolerated procedure well    Post vital signs: stable    Level of consciousness: awake and responds to stimulation    Nausea/Vomiting: no nausea/vomiting    Complications: none    Transfer of care protocol was followed      Last vitals: Visit Vitals  BP (!) 148/66   Pulse (!) 44   Temp 36.6 °C (97.8 °F) (Temporal)   Resp 16   Ht 5' 9" (1.753 m)   Wt 82 kg (180 lb 12.4 oz)   SpO2 98%   BMI 26.70 kg/m²     "

## 2024-08-21 LAB
FINAL PATHOLOGIC DIAGNOSIS: NORMAL
GROSS: NORMAL
Lab: NORMAL

## 2024-08-30 ENCOUNTER — LAB VISIT (OUTPATIENT)
Dept: LAB | Facility: HOSPITAL | Age: 79
End: 2024-08-30
Attending: NURSE PRACTITIONER
Payer: MEDICARE

## 2024-08-30 DIAGNOSIS — D64.9 ANEMIA, UNSPECIFIED: Primary | ICD-10-CM

## 2024-08-30 LAB
BASOPHILS # BLD AUTO: 0.03 K/UL (ref 0–0.2)
BASOPHILS NFR BLD: 0.6 % (ref 0–1.9)
DIFFERENTIAL METHOD BLD: ABNORMAL
EOSINOPHIL # BLD AUTO: 0.3 K/UL (ref 0–0.5)
EOSINOPHIL NFR BLD: 6.3 % (ref 0–8)
ERYTHROCYTE [DISTWIDTH] IN BLOOD BY AUTOMATED COUNT: 12.2 % (ref 11.5–14.5)
HCT VFR BLD AUTO: 36.1 % (ref 40–54)
HGB BLD-MCNC: 12.1 G/DL (ref 14–18)
IMM GRANULOCYTES # BLD AUTO: 0.01 K/UL (ref 0–0.04)
IMM GRANULOCYTES NFR BLD AUTO: 0.2 % (ref 0–0.5)
IRON SERPL-MCNC: 155 UG/DL (ref 45–160)
LYMPHOCYTES # BLD AUTO: 1.2 K/UL (ref 1–4.8)
LYMPHOCYTES NFR BLD: 25.8 % (ref 18–48)
MCH RBC QN AUTO: 31.5 PG (ref 27–31)
MCHC RBC AUTO-ENTMCNC: 33.5 G/DL (ref 32–36)
MCV RBC AUTO: 94 FL (ref 82–98)
MONOCYTES # BLD AUTO: 0.2 K/UL (ref 0.3–1)
MONOCYTES NFR BLD: 3.9 % (ref 4–15)
NEUTROPHILS # BLD AUTO: 2.9 K/UL (ref 1.8–7.7)
NEUTROPHILS NFR BLD: 63.2 % (ref 38–73)
NRBC BLD-RTO: 0 /100 WBC
PLATELET # BLD AUTO: 143 K/UL (ref 150–450)
PMV BLD AUTO: 9.3 FL (ref 9.2–12.9)
RBC # BLD AUTO: 3.84 M/UL (ref 4.6–6.2)
SATURATED IRON: 46 % (ref 20–50)
TOTAL IRON BINDING CAPACITY: 336 UG/DL (ref 250–450)
TRANSFERRIN SERPL-MCNC: 227 MG/DL (ref 200–375)
TRANSFERRIN SERPL-MCNC: 227 MG/DL (ref 200–375)
WBC # BLD AUTO: 4.62 K/UL (ref 3.9–12.7)

## 2024-08-30 PROCEDURE — 36415 COLL VENOUS BLD VENIPUNCTURE: CPT | Performed by: NURSE PRACTITIONER

## 2024-08-30 PROCEDURE — 83540 ASSAY OF IRON: CPT | Performed by: NURSE PRACTITIONER

## 2024-08-30 PROCEDURE — 85025 COMPLETE CBC W/AUTO DIFF WBC: CPT | Performed by: NURSE PRACTITIONER

## 2024-09-06 ENCOUNTER — OFFICE VISIT (OUTPATIENT)
Dept: FAMILY MEDICINE | Facility: CLINIC | Age: 79
End: 2024-09-06
Payer: MEDICARE

## 2024-09-06 VITALS
DIASTOLIC BLOOD PRESSURE: 66 MMHG | HEIGHT: 69 IN | HEART RATE: 58 BPM | BODY MASS INDEX: 26.78 KG/M2 | RESPIRATION RATE: 14 BRPM | OXYGEN SATURATION: 99 % | WEIGHT: 180.81 LBS | SYSTOLIC BLOOD PRESSURE: 122 MMHG

## 2024-09-06 DIAGNOSIS — R79.9 ABNORMAL BLOOD CHEMISTRY: Primary | ICD-10-CM

## 2024-09-06 DIAGNOSIS — D64.9 ANEMIA, UNSPECIFIED TYPE: ICD-10-CM

## 2024-09-06 PROCEDURE — 99213 OFFICE O/P EST LOW 20 MIN: CPT | Mod: PBBFAC | Performed by: FAMILY MEDICINE

## 2024-09-06 PROCEDURE — 99999 PR PBB SHADOW E&M-EST. PATIENT-LVL III: CPT | Mod: PBBFAC,,, | Performed by: FAMILY MEDICINE

## 2024-09-06 RX ORDER — OMEPRAZOLE 20 MG/1
20 CAPSULE, DELAYED RELEASE ORAL
COMMUNITY
Start: 2024-08-29

## 2024-09-06 NOTE — PROGRESS NOTES
Subjective:       Patient ID: Norris Nelson is a 79 y.o. male.    Chief Complaint: Results (Discuss lab and colonoscopy results)      Past Medical History:   Diagnosis Date    Carotid stenosis, left     mild    Cyst of left kidney     7.4 cm    Hyperlipidemia     Hypertension     Kidney stones     3 events    Kidney stones 2018    Prostate cancer 1995       Past Surgical History:   Procedure Laterality Date    COLONOSCOPY      normal     COLONOSCOPY N/A 2024    Procedure: COLONOSCOPY;  Surgeon: Keith Mckeon MD;  Location: [facility];  Service: Endoscopy;  Laterality: N/A;    CYST REMOVAL      TESTICLE    EGD, WITH BALLOON DILATION N/A 2024    Procedure: EGD, WITH BALLOON DILATION;  Surgeon: Keith Mckeon MD;  Location: [facility] END[facility];  Service: Endoscopy;  Laterality: N/A;    PROSTATE SURGERY      CANCER REMOVAL    TONSILLECTOMY  1950        Social History     Socioeconomic History    Marital status:     Number of children: 3    Highest education level: Master's degree (e.g., MA, MS, Vanessa, MEd, MSW, NELDA)   Occupational History    Occupation: [employer] worker   Tobacco Use    Smoking status: Former     Current packs/day: 0.00     Types: Cigarettes     Quit date:      Years since quittin.7    Smokeless tobacco: Never   Substance and Sexual Activity    Alcohol use: Yes     Comment: Occasionally    Drug use: Never    Sexual activity: Yes     Partners: Female       Family History   Problem Relation Name Age of Onset    Stroke Mother      Early death Mother          41y/o smoker     Heart attack Father          67 y/o    Obesity Sister      Heart attack Brother      Pacemaker/defibrilator Brother      Melanoma Neg Hx         Review of patient's allergies indicates:  No Known Allergies       Current Outpatient Medications:     amLODIPine (NORVASC) 5 MG tablet, TAKE 1 TABLET ONE TIME DAILY, Disp: 90 tablet, Rfl: 3    aspirin (ECOTRIN) 81 MG EC  tablet, Take 1 tablet (81 mg total) by mouth once daily., Disp: 90 tablet, Rfl: 3    atorvastatin (LIPITOR) 40 MG tablet, TAKE 1 TABLET EVERY EVENING, Disp: 90 tablet, Rfl: 3    ferrous sulfate (IRON) 325 mg (65 mg iron) Tab tablet, Take 1 tablet (325 mg total) by mouth daily with breakfast., Disp: 90 tablet, Rfl: 3    losartan-hydrochlorothiazide 100-25 mg (HYZAAR) 100-25 mg per tablet, Take 1 tablet by mouth once daily., Disp: 90 tablet, Rfl: 3    omeprazole (PRILOSEC) 20 MG capsule, Take 20 mg by mouth., Disp: , Rfl:     vitamin D (VITAMIN D3) 1000 units Tab, Take 1,000 Units by mouth once daily., Disp: , Rfl:     zinc gluconate 50 mg tablet, Take 50 mg by mouth once daily., Disp: , Rfl:     Mr Nelson is an 78 yo male here s/p egd and colonoscopy for anemia . Anemia was noted in 05/20/2024.  He has had consistent improvement in his blood count since that time.  His EGD and colonoscopy did not find a reason for the anemia.  Has been sent back here to follow-up.  Since he continues to improve, I do not think he needs to be referred to Hematology just yet.  Still possible that he had a GI bleed (possibly small bowel) and it healed itself.  He did not swallow the camera capsule, so we do not know.  I do not think hematology would take him at this point in time because he is almost back up to a normal hemoglobin and hematocrit .  I did discuss possibly doing an eConsult with Hematology, but we are going to wait until we recheck his blood counts in another month.  He is feeling fine now states much better than he was last time he was here      Review of Systems   Constitutional:  Negative for activity change, appetite change, chills, diaphoresis and fever.   HENT:  Negative for congestion, ear pain, postnasal drip, rhinorrhea and sore throat.    Eyes:  Negative for pain, discharge, redness and itching.   Respiratory:  Negative for cough and shortness of breath.    Cardiovascular:  Negative for chest pain, palpitations  and leg swelling.   Gastrointestinal:  Negative for abdominal distention, abdominal pain, constipation, diarrhea and nausea.   Genitourinary:  Negative for difficulty urinating, dysuria, frequency and urgency.   Skin:  Negative for color change, rash and wound.   All other systems reviewed and are negative.      Objective:      Physical Exam  Vitals and nursing note reviewed.   Constitutional:       Appearance: Normal appearance. He is normal weight.   HENT:      Head: Normocephalic.      Nose: Nose normal.   Eyes:      Extraocular Movements: Extraocular movements intact.      Conjunctiva/sclera: Conjunctivae normal.      Pupils: Pupils are equal, round, and reactive to light.   Cardiovascular:      Rate and Rhythm: Normal rate and regular rhythm.      Heart sounds: Normal heart sounds. No murmur heard.  Pulmonary:      Effort: Pulmonary effort is normal. No respiratory distress.      Breath sounds: Normal breath sounds.   Musculoskeletal:         General: Normal range of motion.      Cervical back: Normal range of motion and neck supple.   Skin:     General: Skin is warm and dry.   Neurological:      General: No focal deficit present.      Mental Status: He is alert and oriented to person, place, and time.   Psychiatric:         Mood and Affect: Mood normal.         Behavior: Behavior normal.         Assessment:       1. Abnormal blood chemistry    2. Anemia, unspecified type        Plan:         Abnormal blood chemistry  -     CBC auto differential; Future; Expected date: 10/06/2024  -     Comprehensive Metabolic Panel; Future; Expected date: 10/06/2024    Anemia, unspecified type  Comments:  EGD and Colonoscopy 08/16/2024 both WNL  Will repeat cbc/cmp in 1 month. Keep taking iron until next cbc        Risks, benefits, and side effects were discussed with the patient. All questions were answered to the fullest satisfaction of the patient, and pt verbalized understanding and agreement to treatment plan. Pt was to  call with any new or worsening symptoms, or present to the ER.        Lucina Talley MD

## 2024-10-09 ENCOUNTER — LAB VISIT (OUTPATIENT)
Dept: LAB | Facility: HOSPITAL | Age: 79
End: 2024-10-09
Attending: FAMILY MEDICINE
Payer: MEDICARE

## 2024-10-09 DIAGNOSIS — R79.9 ABNORMAL BLOOD CHEMISTRY: ICD-10-CM

## 2024-10-09 LAB
ALBUMIN SERPL BCP-MCNC: 3.7 G/DL (ref 3.5–5.2)
ALP SERPL-CCNC: 61 U/L (ref 55–135)
ALT SERPL W/O P-5'-P-CCNC: 16 U/L (ref 10–44)
ANION GAP SERPL CALC-SCNC: 12 MMOL/L (ref 8–16)
AST SERPL-CCNC: 19 U/L (ref 10–40)
BASOPHILS # BLD AUTO: 0.03 K/UL (ref 0–0.2)
BASOPHILS NFR BLD: 0.8 % (ref 0–1.9)
BILIRUB SERPL-MCNC: 0.5 MG/DL (ref 0.1–1)
BUN SERPL-MCNC: 29 MG/DL (ref 8–23)
CALCIUM SERPL-MCNC: 9 MG/DL (ref 8.7–10.5)
CHLORIDE SERPL-SCNC: 108 MMOL/L (ref 95–110)
CO2 SERPL-SCNC: 24 MMOL/L (ref 23–29)
CREAT SERPL-MCNC: 1.6 MG/DL (ref 0.5–1.4)
DIFFERENTIAL METHOD BLD: ABNORMAL
EOSINOPHIL # BLD AUTO: 0.2 K/UL (ref 0–0.5)
EOSINOPHIL NFR BLD: 5.3 % (ref 0–8)
ERYTHROCYTE [DISTWIDTH] IN BLOOD BY AUTOMATED COUNT: 12.4 % (ref 11.5–14.5)
EST. GFR  (NO RACE VARIABLE): 43 ML/MIN/1.73 M^2
GLUCOSE SERPL-MCNC: 100 MG/DL (ref 70–110)
HCT VFR BLD AUTO: 34.3 % (ref 40–54)
HGB BLD-MCNC: 11.6 G/DL (ref 14–18)
IMM GRANULOCYTES # BLD AUTO: 0.01 K/UL (ref 0–0.04)
IMM GRANULOCYTES NFR BLD AUTO: 0.3 % (ref 0–0.5)
LYMPHOCYTES # BLD AUTO: 1.3 K/UL (ref 1–4.8)
LYMPHOCYTES NFR BLD: 32.6 % (ref 18–48)
MCH RBC QN AUTO: 31.4 PG (ref 27–31)
MCHC RBC AUTO-ENTMCNC: 33.8 G/DL (ref 32–36)
MCV RBC AUTO: 93 FL (ref 82–98)
MONOCYTES # BLD AUTO: 0.4 K/UL (ref 0.3–1)
MONOCYTES NFR BLD: 9.3 % (ref 4–15)
NEUTROPHILS # BLD AUTO: 2.1 K/UL (ref 1.8–7.7)
NEUTROPHILS NFR BLD: 51.7 % (ref 38–73)
NRBC BLD-RTO: 0 /100 WBC
PLATELET # BLD AUTO: 145 K/UL (ref 150–450)
PMV BLD AUTO: 9.3 FL (ref 9.2–12.9)
POTASSIUM SERPL-SCNC: 3.7 MMOL/L (ref 3.5–5.1)
PROT SERPL-MCNC: 6.5 G/DL (ref 6–8.4)
RBC # BLD AUTO: 3.69 M/UL (ref 4.6–6.2)
SODIUM SERPL-SCNC: 144 MMOL/L (ref 136–145)
WBC # BLD AUTO: 3.99 K/UL (ref 3.9–12.7)

## 2024-10-09 PROCEDURE — 36415 COLL VENOUS BLD VENIPUNCTURE: CPT | Performed by: FAMILY MEDICINE

## 2024-10-09 PROCEDURE — 80053 COMPREHEN METABOLIC PANEL: CPT | Performed by: FAMILY MEDICINE

## 2024-10-09 PROCEDURE — 85025 COMPLETE CBC W/AUTO DIFF WBC: CPT | Performed by: FAMILY MEDICINE

## 2024-12-18 ENCOUNTER — OFFICE VISIT (OUTPATIENT)
Dept: CARDIOLOGY | Facility: CLINIC | Age: 79
End: 2024-12-18
Payer: MEDICARE

## 2024-12-18 ENCOUNTER — LAB VISIT (OUTPATIENT)
Dept: LAB | Facility: HOSPITAL | Age: 79
End: 2024-12-18
Attending: INTERNAL MEDICINE
Payer: MEDICARE

## 2024-12-18 VITALS
DIASTOLIC BLOOD PRESSURE: 66 MMHG | OXYGEN SATURATION: 100 % | HEART RATE: 48 BPM | WEIGHT: 186.31 LBS | SYSTOLIC BLOOD PRESSURE: 131 MMHG | BODY MASS INDEX: 27.6 KG/M2 | HEIGHT: 69 IN

## 2024-12-18 DIAGNOSIS — I10 ESSENTIAL HYPERTENSION: ICD-10-CM

## 2024-12-18 DIAGNOSIS — I70.0 AORTIC ATHEROSCLEROSIS: ICD-10-CM

## 2024-12-18 DIAGNOSIS — I65.22 STENOSIS OF LEFT CAROTID ARTERY: ICD-10-CM

## 2024-12-18 DIAGNOSIS — D64.9 ANEMIA, UNSPECIFIED TYPE: ICD-10-CM

## 2024-12-18 DIAGNOSIS — N18.32 STAGE 3B CHRONIC KIDNEY DISEASE: ICD-10-CM

## 2024-12-18 DIAGNOSIS — R00.1 BRADYCARDIA: ICD-10-CM

## 2024-12-18 DIAGNOSIS — E78.2 MIXED HYPERLIPIDEMIA: ICD-10-CM

## 2024-12-18 DIAGNOSIS — I11.9 LVH (LEFT VENTRICULAR HYPERTROPHY) DUE TO HYPERTENSIVE DISEASE, WITHOUT HEART FAILURE: ICD-10-CM

## 2024-12-18 DIAGNOSIS — Z91.89 AT RISK FOR CARDIOVASCULAR EVENT: ICD-10-CM

## 2024-12-18 DIAGNOSIS — E65 ABDOMINAL OBESITY: ICD-10-CM

## 2024-12-18 DIAGNOSIS — Z91.89 AT RISK FOR CARDIOVASCULAR EVENT: Primary | ICD-10-CM

## 2024-12-18 LAB
CHOLEST SERPL-MCNC: 143 MG/DL (ref 120–199)
CHOLEST/HDLC SERPL: 2.5 {RATIO} (ref 2–5)
HDLC SERPL-MCNC: 57 MG/DL (ref 40–75)
HDLC SERPL: 39.9 % (ref 20–50)
LDLC SERPL CALC-MCNC: 70.4 MG/DL (ref 63–159)
NONHDLC SERPL-MCNC: 86 MG/DL
OHS QRS DURATION: 88 MS
OHS QTC CALCULATION: 391 MS
TRIGL SERPL-MCNC: 78 MG/DL (ref 30–150)

## 2024-12-18 PROCEDURE — 99213 OFFICE O/P EST LOW 20 MIN: CPT | Mod: PBBFAC | Performed by: INTERNAL MEDICINE

## 2024-12-18 PROCEDURE — 99999 PR PBB SHADOW E&M-EST. PATIENT-LVL III: CPT | Mod: PBBFAC,,, | Performed by: INTERNAL MEDICINE

## 2024-12-18 PROCEDURE — 99215 OFFICE O/P EST HI 40 MIN: CPT | Mod: S$PBB,25,, | Performed by: INTERNAL MEDICINE

## 2024-12-18 PROCEDURE — 93005 ELECTROCARDIOGRAM TRACING: CPT | Mod: PBBFAC | Performed by: INTERNAL MEDICINE

## 2024-12-18 PROCEDURE — 80061 LIPID PANEL: CPT | Performed by: INTERNAL MEDICINE

## 2024-12-18 PROCEDURE — 93010 ELECTROCARDIOGRAM REPORT: CPT | Mod: S$PBB,,, | Performed by: INTERNAL MEDICINE

## 2024-12-18 PROCEDURE — 36415 COLL VENOUS BLD VENIPUNCTURE: CPT | Performed by: INTERNAL MEDICINE

## 2024-12-18 NOTE — PROGRESS NOTES
Patient ID:  Norris Nelson is a 79 y.o. male who presents to Annual Exam  For cardiac follow up, HTN, HLD, mild Carotid stenosis  PCP: Michelle Barfield NP  Prior cardiologist, see yearly, Dr. Ji in [place]  Lives with wife, Soledad, here with patient, non-smoker  Retired  of water and sewage treatment    Health literacy: Medium  Activities: ADL's, do yard work, walking 2 miles a day, had blood blisters. Going to restart, also up 19 steps up to the house. Up and down all day without problem.  Nicotine: Quit smoking in 1974, 1 pack/day x 10+ years  Alcohol: Socially, max 1 in any 24 hours. Just holidays. Rarely  Illicit drugs: Denies   Cardiac symptoms: None at present  Home BP: do not check, recall 130 to 140  Medication compliance: Yes, do not miss  Diet: regular, some salt  Caffeine: 1 cups/day, 1 coke/day  Labs:   Lab Results   Component Value Date    TSH 0.851 12/20/2023        Lab Results   Component Value Date    HGBA1C 5.4 03/06/2024       Lab Results   Component Value Date    WBC 3.99 10/09/2024    HGB 11.6 (L) 10/09/2024    HCT 34.3 (L) 10/09/2024    MCV 93 10/09/2024     (L) 10/09/2024       CMP  Sodium   Date Value Ref Range Status   10/09/2024 144 136 - 145 mmol/L Final     Potassium   Date Value Ref Range Status   10/09/2024 3.7 3.5 - 5.1 mmol/L Final     Chloride   Date Value Ref Range Status   10/09/2024 108 95 - 110 mmol/L Final     CO2   Date Value Ref Range Status   10/09/2024 24 23 - 29 mmol/L Final     Glucose   Date Value Ref Range Status   10/09/2024 100 70 - 110 mg/dL Final     BUN   Date Value Ref Range Status   10/09/2024 29 (H) 8 - 23 mg/dL Final     Creatinine   Date Value Ref Range Status   10/09/2024 1.6 (H) 0.5 - 1.4 mg/dL Final     Calcium   Date Value Ref Range Status   10/09/2024 9.0 8.7 - 10.5 mg/dL Final     Total Protein   Date Value Ref Range Status   10/09/2024 6.5 6.0 - 8.4 g/dL Final     Albumin   Date Value Ref Range Status  "  10/09/2024 3.7 3.5 - 5.2 g/dL Final     Total Bilirubin   Date Value Ref Range Status   10/09/2024 0.5 0.1 - 1.0 mg/dL Final     Comment:     For infants and newborns, interpretation of results should be based  on gestational age, weight and in agreement with clinical  observations.    Premature Infant recommended reference ranges:  Up to 24 hours.............<8.0 mg/dL  Up to 48 hours............<12.0 mg/dL  3-5 days..................<15.0 mg/dL  6-29 days.................<15.0 mg/dL       Alkaline Phosphatase   Date Value Ref Range Status   10/09/2024 61 55 - 135 U/L Final     AST   Date Value Ref Range Status   10/09/2024 19 10 - 40 U/L Final     ALT   Date Value Ref Range Status   10/09/2024 16 10 - 44 U/L Final     Anion Gap   Date Value Ref Range Status   10/09/2024 12 8 - 16 mmol/L Final     eGFR if    Date Value Ref Range Status   12/10/2021 >60.0 >60 mL/min/1.73 m^2 Final     eGFR if non    Date Value Ref Range Status   12/10/2021 52.3 (A) >60 mL/min/1.73 m^2 Final     Comment:     Calculation used to obtain the estimated glomerular filtration  rate (eGFR) is the CKD-EPI equation.        @labrcntip(troponini)@  No results found for: "BNP"}   Lab Results   Component Value Date    CHOL 130 12/20/2023    CHOL 134 12/19/2022    CHOL 141 12/10/2021     Lab Results   Component Value Date    HDL 63 12/20/2023    HDL 59 12/19/2022    HDL 64 12/10/2021     Lab Results   Component Value Date    LDLCALC 44.6 (L) 12/20/2023    LDLCALC 63.8 12/19/2022    LDLCALC 65.6 12/10/2021     Lab Results   Component Value Date    TRIG 112 12/20/2023    TRIG 56 12/19/2022    TRIG 57 12/10/2021     Lab Results   Component Value Date    CHOLHDL 48.5 12/20/2023    CHOLHDL 44.0 12/19/2022    CHOLHDL 45.4 12/10/2021     Lab Results   Component Value Date    IRON 155 08/30/2024    TRANSFERRIN 227 08/30/2024    TRANSFERRIN 227 08/30/2024    TIBC 336 08/30/2024    FESATURATED 46 08/30/2024      Lab Results " "  Component Value Date    FERRITIN 69 2024     Urine 2022 negative for microalbluminuria    Last Echo: 2022, TMET   Last stress test: 2022  Cardiovascular angiogram: None  ECG: SB with SA, rate 48, 1st degree AVB  Fundoscopic exam: due, no retinopathy detected    In 10/2019:  WM here for annual follow up, no specific heart condition but had premature family history for CAD and stoke, mother in her 40s and father  at age 68. No heart worries and no cardiac symptoms.     Ms. Michelle A. BRITTANY Barfield noted "Appt schedule with Dr. Ryan- no specific complaints encouraged to discuss left carotid stenosis mild noted in ."     In 2022, return at wife's request. No heart worries. At very high ASCVD 10-year event risk of almost 40%    Stress Echo 2020 - The patient's exercise capacity was excellent. functional capacity of 12 METS  The patient reached the end of the protocol.  During stress, the following significant arrhythmias were observed: occasional PVCs.  Eccentric left ventricular hypertrophy.  Mild left ventricular enlargement.  Normal left ventricular systolic function. The estimated ejection fraction is 65%.  Normal LV diastolic function.  Normal right ventricular systolic function.  Mild right atrial enlargement.  The ECG portion of this study is negative for myocardial ischemia.  The stress echo portion of this study is negative for myocardial ischemia.  No wall motion abnormalities.  Mild left atrial enlargement.    Carotid US - No significant Carotid disease seen.  Vertebral arteries shows antegrade flow.    Michelle Barfield NP noted 2022 - "Renal Stone CT 22 with Vasculature: Calcific atherosclerosis of the abdominal aorta.  No aneurysm taking 40 mg lipitor and last lipid panel 2021 WNL."    In 2023, return for annual review. No heart issue. Back working PT without problem.    TMET Echo 2023 - The left ventricle is normal in size with concentric " hypertrophy and normal systolic function.  The estimated ejection fraction is 70%.  Normal left ventricular diastolic function.  Normal right ventricular size.  Small pericardial effusion.  The stress echo portion of this study is negative for myocardial ischemia.  The patient's exercise capacity was above average. Achieved 10 METS.  The patient reached the end of the protocol.  During stress, the following significant arrhythmias were observed: occasional PACs, occasional PVCs.  The ECG portion of this study is negative for myocardial ischemia.  Mild left atrial enlargement.  Patient reported shortness of breath during stress. Shortness of breath reported by the patient was mild.    Holter 12/2022 - Monitoring started at 7:14 AM and continued for 47 hr 59 min. The average heart rate was 48 BPM. The minimum heart rate was 38 BPM, occurring at 1:45:31 PM D1. The maximum heart rate was 83 BPM, occurring at 10:37:52 PM D2.  Ventricular ectopic activity consisted of 109 beats, of which, 4 were in 1 run, 3 were in triplets, 96 were in single PVCs, 3 were single VEs, 3 were in bigeminy. The longest ventricular run occurred at 2:39:26 AM D1, consisting of 4 beats, with maximum heart rate of 156 BPM. The fastest ventricular run occurred at 2:39:26 AM D1, consisting of 4 beats, with maximum heart rate of 156 BPM.  Supraventricular ectopic activity consisted of 6663 beats (5.1% of complexes), of which, 128 were in atrial couplets, 18 were late beats, 4104 were single PACs, 2366 were in bigeminy, 47 were in trigeminy. The longest R-R interval was 2.0 seconds occurring at 6:44:51 PM D2. The longest N-N interval was 1.8 seconds occurring at 1:44:17 AM D2.  Predominant Rhythm Sinus rhythm with frequent PACs and single run of NSVT as noted above.  No symptom identified.    HPI comments: in 12/2024, return for annual review. Little change, no heart worries. Denies any CP nor SOB. Trying to remain active.    Abdominal US, 5/2024 -  Right kidney: 13.2 cm. No hydronephrosis.  7.6 x 6.3 x 8.7 cm septated cyst previously measured at 7 x 8.5 x 7 cm     Left kidney: 13.4 cm. No hydronephrosis.  Multiple cysts largest measuring 6.8 cm previously measured at 5.6 cm.  9 mm echogenic focus suggesting stone.    Inferior vena cava: Normal in appearance.    Aorta: No aneurysm.    Review of Systems   Constitutional: Positive for weight gain (up 5 lbs from 12/2023). Negative for diaphoresis, fever, malaise/fatigue and night sweats.   HENT:  Positive for hearing loss (Mederate hearing loss AU) and tinnitus. Negative for nosebleeds.    Eyes:  Negative for visual disturbance.        Wears readers   Cardiovascular: Negative.  Negative for chest pain, claudication, cyanosis, dyspnea on exertion, irregular heartbeat, leg swelling, near-syncope, orthopnea, palpitations and paroxysmal nocturnal dyspnea.   Respiratory:  Positive for cough and sputum production. Negative for shortness of breath, sleep disturbances due to breathing, snoring and wheezing.         [place] = 8, today a 6, awaken refreshed.   Endocrine: Negative.  Negative for polydipsia and polyuria.   Hematologic/Lymphatic: Bruises/bleeds easily.   Skin:  Negative for flushing, nail changes, poor wound healing and suspicious lesions. Color change: When bumps into something, states skin is very thin and easily tears.  Musculoskeletal:  Positive for arthritis (Knees), back pain (Lower back with no radiation) and joint pain (Knees). Negative for falls, gout, joint swelling, muscle weakness and myalgias. Muscle cramps: occassional Lower ext.  Gastrointestinal:  Positive for constipation and flatus. Negative for hematemesis, hematochezia, melena and nausea. Change in bowel habit: with iron supplement..  Genitourinary: Negative.    Neurological: Negative.  Negative for disturbances in coordination, excessive daytime sleepiness, dizziness, focal weakness, headaches, light-headedness, loss of balance, numbness,  "vertigo and weakness.   Psychiatric/Behavioral:  Positive for memory loss. Negative for depression and substance abuse. The patient does not have insomnia and is not nervous/anxious.    Allergic/Immunologic: Negative.         Objective:    Physical Exam  Constitutional:       Appearance: He is well-developed.      Comments: RA O2 sat 100%  Orthostatic VS: sitting 137/73, standing 131/66   HENT:      Head: Normocephalic.   Eyes:      Conjunctiva/sclera: Conjunctivae normal.      Pupils: Pupils are equal, round, and reactive to light.   Neck:      Thyroid: No thyromegaly.      Vascular: No JVD.      Comments: Circumference 15"  Cardiovascular:      Rate and Rhythm: Regular rhythm. Bradycardia present.      Pulses: Intact distal pulses.           Carotid pulses are 1+ on the right side and 1+ on the left side with bruit.       Radial pulses are 1+ on the right side and 1+ on the left side.        Dorsalis pedis pulses are 1+ on the right side and 1+ on the left side.        Posterior tibial pulses are 1+ on the right side and 1+ on the left side.      Heart sounds: Heart sounds are distant. No murmur heard.     No friction rub. No gallop.   Pulmonary:      Effort: Pulmonary effort is normal.      Breath sounds: Normal breath sounds. No rales.   Chest:      Chest wall: No tenderness.   Abdominal:      General: Bowel sounds are normal.      Palpations: Abdomen is soft.      Tenderness: There is no abdominal tenderness.      Comments: Waist 41.75" today 40.5", hip 40"   Musculoskeletal:         General: Normal range of motion.      Cervical back: Normal range of motion and neck supple.   Lymphadenopathy:      Cervical: No cervical adenopathy.   Skin:     General: Skin is warm and dry.      Findings: No rash.   Neurological:      Mental Status: He is alert and oriented to person, place, and time.           Assessment:       1. At risk for cardiovascular event, ASCVD 10-year risk 39%, 2021    2. Essential hypertension, dx " 2010    3. LVH (left ventricular hypertrophy) due to hypertensive disease, without heart failure    4. Anemia, unspecified type    5. Abdominal obesity    6. Aortic atherosclerosis    7. Bradycardia    8. Mixed hyperlipidemia    9. Stage 3b chronic kidney disease    10. Stenosis of left carotid artery, 2015         Plan:         At risk for cardiovascular event, ASCVD 10-year risk 39%, 2021  -     IN OFFICE EKG 12-LEAD (to Muse)    Essential hypertension, dx 2010  -     IN OFFICE EKG 12-LEAD (to Muse)    LVH (left ventricular hypertrophy) due to hypertensive disease, without heart failure    Anemia, unspecified type    Abdominal obesity    Aortic atherosclerosis    Bradycardia    Mixed hyperlipidemia    Stage 3b chronic kidney disease    Stenosis of left carotid artery, 2015    - All medical issues reviewed, continue current Rx. Aspirin is now consider optional for primary prevention of CV disease.   - Warning signs of MI and stroke given, if symptoms last more than 5 minutes, stop immediately and call 911, then chew 2-4 low-dose ASA (81 mg).  - Consider use of Potassium chloride salt substitute, [place] Nu-Salt.    - Need annual fundoscopic examination.  - CV status and all medications reviewed, patient acknowledge good understanding.  - Recommend healthy living: moderate alcohol, healthy diet and regular exercise aiming for fitness, restorative sleep and weight control  - Discussed healthy daily limit of 1 oz of pure alcohol in any 24 hours (roughly 2 12-oz beers, 8 oz of wine (8%-12% alcohol), or 2.5 oz of liquor (80 proof)), can not save up.   - Instruction for Mediterranean, high potassium diet and heart healthy exercise given.  - Check home blood pressure, 2 days weekly, do 2 readings within 5 minutes in AM and PM, keep log for review. Target resting BP is less than 130/80.   - Have to do some abdominal exercise to rid belly fat  - Highly recommend 30-60 minutes of exercise / activities daily, can have Sunday  off, with 2-3 sessions of muscle strengthening weekly. A  would be very helpful.  - Recommend at least annual cardiovascular evaluation in view of patient's significant risk factors.  - Phone review / encourage use of [facility].     Total time spend including review of record prior to face-to-face visit is 40 minutes. Greater than 50% of the time was spent in counseling and coordination of care. The above assessment and plan have been discussed at length. Referring provider's note reviewed. Labs and procedure over the last 6 months reviewed. Problem List updated. Asked to bring in all active medications / pills bottles with next visit. Will send note to referring / PCP.  .

## 2025-01-30 DIAGNOSIS — Z00.00 ENCOUNTER FOR MEDICARE ANNUAL WELLNESS EXAM: ICD-10-CM

## 2025-03-04 DIAGNOSIS — I10 ESSENTIAL HYPERTENSION: ICD-10-CM

## 2025-03-04 DIAGNOSIS — I10 PRIMARY HYPERTENSION: ICD-10-CM

## 2025-03-04 NOTE — TELEPHONE ENCOUNTER
No care due was identified.  [facility] Embedded Care Due Messages. Reference number: [id number].   3/04/2025 3:08:02 PM CST

## 2025-03-05 DIAGNOSIS — I10 ESSENTIAL HYPERTENSION: ICD-10-CM

## 2025-03-05 RX ORDER — AMLODIPINE BESYLATE 5 MG/1
5 TABLET ORAL
Qty: 90 TABLET | Refills: 3 | Status: SHIPPED | OUTPATIENT
Start: 2025-03-05

## 2025-03-05 RX ORDER — LOSARTAN POTASSIUM AND HYDROCHLOROTHIAZIDE 25; 100 MG/1; MG/1
TABLET ORAL
Refills: 0 | OUTPATIENT
Start: 2025-03-05

## 2025-03-05 RX ORDER — AMLODIPINE BESYLATE 5 MG/1
TABLET ORAL
Refills: 0 | OUTPATIENT
Start: 2025-03-05

## 2025-03-05 NOTE — TELEPHONE ENCOUNTER
Refill Decision Note   Norris Nelson  is requesting a refill authorization.  Brief Assessment and Rationale for Refill:  Quick Discontinue     Medication Therapy Plan:  Pharmacy is requesting new scripts for the following medications without required information, (sig/ frequency/qty/etc)      Comments: Pharmacies have been requesting medications for patients without required information, (sig, frequency, qty, etc.). In addition, requests are sent for medication(s) pt. are currently not taking, and medications patients have never taken.    We have spoken to the pharmacies about these request types and advised their teams previously that we are unable to assess these New Script requests and require all details for these requests. This is a known issue and has been reported.    Note composed:12:57 PM 03/05/2025

## 2025-03-06 ENCOUNTER — HOSPITAL ENCOUNTER (OUTPATIENT)
Dept: RADIOLOGY | Facility: HOSPITAL | Age: 80
Discharge: HOME | End: 2025-03-06
Attending: FAMILY MEDICINE
Payer: MEDICARE

## 2025-03-06 ENCOUNTER — RESULTS FOLLOW-UP (OUTPATIENT)
Dept: FAMILY MEDICINE | Facility: CLINIC | Age: 80
End: 2025-03-06

## 2025-03-06 ENCOUNTER — OFFICE VISIT (OUTPATIENT)
Dept: FAMILY MEDICINE | Facility: CLINIC | Age: 80
End: 2025-03-06
Payer: MEDICARE

## 2025-03-06 VITALS
OXYGEN SATURATION: 99 % | HEIGHT: 69 IN | DIASTOLIC BLOOD PRESSURE: 78 MMHG | RESPIRATION RATE: 14 BRPM | HEART RATE: 40 BPM | BODY MASS INDEX: 27.79 KG/M2 | SYSTOLIC BLOOD PRESSURE: 124 MMHG | WEIGHT: 187.63 LBS

## 2025-03-06 DIAGNOSIS — M79.641 PAIN OF RIGHT HAND: ICD-10-CM

## 2025-03-06 DIAGNOSIS — Z12.5 PROSTATE CANCER SCREENING: ICD-10-CM

## 2025-03-06 DIAGNOSIS — E78.2 MIXED HYPERLIPIDEMIA: ICD-10-CM

## 2025-03-06 DIAGNOSIS — R53.83 FATIGUE, UNSPECIFIED TYPE: ICD-10-CM

## 2025-03-06 DIAGNOSIS — D69.6 THROMBOCYTOPENIA, UNSPECIFIED: ICD-10-CM

## 2025-03-06 DIAGNOSIS — R79.9 ABNORMAL BLOOD CHEMISTRY: ICD-10-CM

## 2025-03-06 DIAGNOSIS — C61 PROSTATE CANCER: ICD-10-CM

## 2025-03-06 DIAGNOSIS — Z79.899 HIGH RISK MEDICATION USE: ICD-10-CM

## 2025-03-06 DIAGNOSIS — I70.0 AORTIC ATHEROSCLEROSIS: ICD-10-CM

## 2025-03-06 DIAGNOSIS — N18.32 STAGE 3B CHRONIC KIDNEY DISEASE: ICD-10-CM

## 2025-03-06 DIAGNOSIS — N28.9 KIDNEY DISEASE: Primary | ICD-10-CM

## 2025-03-06 DIAGNOSIS — I47.29 NSVT (NONSUSTAINED VENTRICULAR TACHYCARDIA): ICD-10-CM

## 2025-03-06 DIAGNOSIS — I10 PRIMARY HYPERTENSION: Primary | ICD-10-CM

## 2025-03-06 DIAGNOSIS — D64.9 ANEMIA, UNSPECIFIED TYPE: ICD-10-CM

## 2025-03-06 DIAGNOSIS — K21.9 GASTROESOPHAGEAL REFLUX DISEASE, UNSPECIFIED WHETHER ESOPHAGITIS PRESENT: ICD-10-CM

## 2025-03-06 DIAGNOSIS — E55.9 VITAMIN D DEFICIENCY: ICD-10-CM

## 2025-03-06 DIAGNOSIS — Z00.00 ENCOUNTER FOR ANNUAL WELLNESS VISIT: ICD-10-CM

## 2025-03-06 PROCEDURE — 73130 X-RAY EXAM OF HAND: CPT | Mod: TC,RT

## 2025-03-06 PROCEDURE — 99999 PR PBB SHADOW E&M-EST. PATIENT-LVL III: CPT | Mod: PBBFAC,,, | Performed by: FAMILY MEDICINE

## 2025-03-06 PROCEDURE — 73130 X-RAY EXAM OF HAND: CPT | Mod: 26,RT,, | Performed by: RADIOLOGY

## 2025-03-06 PROCEDURE — 99213 OFFICE O/P EST LOW 20 MIN: CPT | Mod: PBBFAC,25 | Performed by: FAMILY MEDICINE

## 2025-03-06 RX ORDER — HYDROGEN PEROXIDE 3 %
20 SOLUTION, NON-ORAL MISCELLANEOUS
Qty: 90 CAPSULE | Refills: 3 | Status: SHIPPED | OUTPATIENT
Start: 2025-03-06 | End: 2026-03-06

## 2025-03-06 NOTE — PROGRESS NOTES
Patient ID: Norris Nelson is a 80 y.o. male.    Chief Complaint: Follow-up (6 months)    History of Present Illness    CHIEF COMPLAINT:  Norris presents today for a six-month routine medical exam.    HAND PAIN:  He reports pain in the middle finger with inability to fully close the finger for approximately 1.5 months. He experiences pain with handshaking, particularly when encountering firm .    CARDIOVASCULAR:  He has multiple cardiac risk factors including hypertension, left ventricular hypertrophy, atherosclerotic aorta, bradycardia, mixed hyperlipidemia, and history of carotid artery stenosis. He experiences dizziness upon standing, particularly in the morning when first getting out of bed.    RENAL:  He has stage 3b chronic kidney disease. CMP showed BUN of 29, creatinine of 1.6, and GFR of 43.    GASTROINTESTINAL:  He has a history of hiatal hernia with GERD. Endoscopy showed peptic duodenitis without esophagitis. He has undergone esophageal dilatation in the past and has a history of colonoscopy with polyp removal. He discontinued Prilosec due to GI issues, reporting soft stools but not complete diarrhea.    LABORATORY RESULTS:  CBC showed mild anemia in June 2024 which subsequently improved. Anemia panel was within acceptable range in June and August 2024. Lipid panel showed total cholesterol of 143, HDL of 57, LDL 70, triglycerides 78, and total/HDL ratio of 2.5. B12 level was low normal at 378. Vitamin D level was adequate at 76. A1c was 5.4, in the non-diabetic range. Thyroid test was within acceptable range. PSA was very low at less than 0.01.      ROS:  ROS as indicated in HPI.         Physical Exam  Constitutional:       Appearance: Normal appearance.   HENT:      Head: Normocephalic and atraumatic.      Nose: Nose normal.   Eyes:      Extraocular Movements: Extraocular movements intact.      Pupils: Pupils are equal, round, and reactive to light.   Cardiovascular:      Rate and Rhythm: Regular  rhythm. Bradycardia present.      Pulses: Normal pulses.      Comments: Heart rate 40, this is unusual for this patient.  Generally he is in the 60s but has been trending down over the last year two.  Followed by Dr. Ryan.  Contacted Dr. Ryan and an EKG ordered  Pulmonary:      Effort: Pulmonary effort is normal. No respiratory distress.      Breath sounds: Normal breath sounds. No wheezing or rhonchi.   Musculoskeletal:      Comments: Right hand painful at the base of the middle finger as well as the middle finger itself   Skin:     General: Skin is warm and dry.   Neurological:      General: No focal deficit present.      Mental Status: He is alert and oriented to person, place, and time.   Psychiatric:         Mood and Affect: Mood normal.         Behavior: Behavior normal.         Thought Content: Thought content normal.          Assessment & Plan    IMPRESSION:  - Reviewed recent lab results, including CBC, anemia panel, CMP, lipid panel, B12, vitamin D, A1c, thyroid, PSA, and microalbumin creatinine ratio  - Noted stage 3 chronic kidney disease with recent decline in GFR  - Assessed cardiovascular risk factors, including hypertension, left ventricular hypertrophy, atherosclerotic aorta, bradycardia, mixed hyperlipidemia, carotid artery stenosis  - Evaluated chronic anemia, likely due to chronic kidney disease  - Considered change in proton pump inhibitor due to patient-reported side effects  - Assessed very low heart rate (40 bpm), consulted with Dr. Gonzalez (cardiologist)  - Evaluated hand pain and limited range of motion in middle finger    KIDNEY FUNCTION:  - Explained importance of hydration for kidney function.  - Norris to increase water intake to at least 110 ounces daily.    POSTURAL HYPOTENSION:  - Discussed potential risks of postural hypotension, especially when on blood pressure medication.  - Norris to use caution when standing up to avoid dizziness.    IMMUNIZATIONS:  - Provided information on care  gaps and available vaccines.  - Explained differences between IgM and IgG antibodies in immune response.    HYPERTENSION:  - Recommend using specialty salts as an alternative to regular salt.  - Norris to check blood pressure at least 2 times weekly due to low blood pressure.    GASTROESOPHAGEAL REFLUX DISEASE (GERD):  - Norris to avoid triggering foods for GERD.  - Started Nexium (esomeprazole).  - Discontinued Prilosec (omeprazole) 20 mg daily.    CONSTIPATION:  - Norris can use Miralax for constipation if needed.  - Recommend increasing fiber intake.    DIET AND NUTRITION:  - Norris to follow Mediterranean diet.    WEIGHT MANAGEMENT:  - Recommend performing abdominal exercises to reduce belly fat.    ALCOHOL CONSUMPTION:  - Norris to maintain moderate alcohol consumption.    EXERCISE:  - Recommend engaging in regular exercise program.    MEDICATIONS/SUPPLEMENTS:  - Continued aspirin, atorvastatin, losartan, amlodipine, and vitamin D supplement.  - Refilled atorvastatin, losartan, and amlodipine.    LABS:  - CBC, CMP, vitamin D, A1c, thyroid, PSA, and microalbumin creatinine ratio ordered as routine labs for annual wellness exam (excluding lipid panel).  - CBC ordered to check anemia status.    X-RAY:  - X-ray of hand (middle finger) ordered.    FOLLOW UP:  - Follow up if any new symptoms develop, especially related to low heart rate.  - Contact the office if unable to obtain Nexium prescription.         Plan:          Primary hypertension  -     Microalbumin/Creatinine Ratio, Urine; Future; Expected date: 03/06/2025  -     Comprehensive Metabolic Panel; Future; Expected date: 03/06/2025  -     TSH; Future; Expected date: 03/06/2025    Abnormal blood chemistry  -     Vitamin D; Future; Expected date: 03/06/2025  -     Vitamin B12; Future; Expected date: 03/06/2025  -     Hemoglobin A1C; Future; Expected date: 03/06/2025  -     TSH; Future; Expected date: 03/06/2025    Anemia, unspecified type  -     CBC Auto  Differential; Future; Expected date: 03/06/2025    Mixed hyperlipidemia  -     Cancel: Lipid Panel; Future; Expected date: 03/06/2025    Fatigue, unspecified type  -     CBC Auto Differential; Future; Expected date: 03/06/2025  -     Hemoglobin A1C; Future; Expected date: 03/06/2025  -     TSH; Future; Expected date: 03/06/2025    Prostate cancer  -     PSA, Screening; Future; Expected date: 03/06/2025    Thrombocytopenia, unspecified  -     CBC Auto Differential; Future; Expected date: 03/06/2025    Vitamin D deficiency  -     Vitamin D; Future; Expected date: 03/06/2025    Prostate cancer screening  -     PSA, Screening; Future; Expected date: 03/06/2025    High risk medication use  -     Vitamin D; Future; Expected date: 03/06/2025  -     Vitamin B12; Future; Expected date: 03/06/2025    Encounter for annual wellness visit  -     Microalbumin/Creatinine Ratio, Urine; Future; Expected date: 03/06/2025  -     Cancel: Lipid Panel; Future; Expected date: 03/06/2025  -     Comprehensive Metabolic Panel; Future; Expected date: 03/06/2025  -     Hemoglobin A1C; Future; Expected date: 03/06/2025    NSVT (nonsustained ventricular tachycardia)  Comments:  Resolved now bradycardic    Stage 3b chronic kidney disease  Comments:  Rechecked CMP day    Aortic atherosclerosis  Comments:  Followed by     Gastroesophageal reflux disease, unspecified whether esophagitis present  -     esomeprazole (NEXIUM) 20 MG capsule; Take 1 capsule (20 mg total) by mouth before breakfast.  Dispense: 90 capsule; Refill: 3    Pain of right hand  -     X-Ray Hand 3 view Right; Future; Expected date: 03/06/2025    In excessive of 40 minutes total time spent for evaluation and management services. Time included elements of the following: time spent preparing to see patient, obtaining and reviewing separately obtained history, exam, evaluation, counseling and education of patient/family member or care giver, documenting in the HMR, independently  interpreting results and communication of results, coordination of care ordering medications, tests, or procedures, referral and communication to other health care professionals.      Follow up in 6 months (on 9/6/2025), or if symptoms worsen or fail to improve.    This note was generated with the assistance of ambient listening technology. Verbal consent was obtained by the patient and accompanying visitor(s) for the recording of patient appointment to facilitate this note. I attest to having reviewed and edited the generated note for accuracy, though some syntax or spelling errors may persist. Please contact the author of this note for any clarification.

## 2025-06-20 ENCOUNTER — HOSPITAL ENCOUNTER (OUTPATIENT)
Dept: RADIOLOGY | Facility: HOSPITAL | Age: 80
Discharge: HOME | End: 2025-06-20
Attending: INTERNAL MEDICINE
Payer: MEDICARE

## 2025-06-20 ENCOUNTER — RESULTS FOLLOW-UP (OUTPATIENT)
Dept: FAMILY MEDICINE | Facility: CLINIC | Age: 80
End: 2025-06-20

## 2025-06-20 DIAGNOSIS — N28.1 COMPLEX RENAL CYST: ICD-10-CM

## 2025-06-20 PROCEDURE — 76770 US EXAM ABDO BACK WALL COMP: CPT | Mod: TC

## 2025-06-20 PROCEDURE — 76770 US EXAM ABDO BACK WALL COMP: CPT | Mod: 26,,, | Performed by: RADIOLOGY

## 2025-08-13 ENCOUNTER — RESULTS FOLLOW-UP (OUTPATIENT)
Dept: FAMILY MEDICINE | Facility: CLINIC | Age: 80
End: 2025-08-13
Payer: MEDICARE

## 2025-08-13 ENCOUNTER — LAB VISIT (OUTPATIENT)
Dept: LAB | Facility: HOSPITAL | Age: 80
End: 2025-08-13
Attending: INTERNAL MEDICINE
Payer: MEDICARE

## 2025-08-13 DIAGNOSIS — N28.1 ACQUIRED CYST OF KIDNEY: ICD-10-CM

## 2025-08-13 DIAGNOSIS — N18.30 CHRONIC KIDNEY DISEASE, STAGE III (MODERATE): ICD-10-CM

## 2025-08-13 DIAGNOSIS — N28.0 THROMBOEMBOLISM OF RENAL ARTERIES: Primary | ICD-10-CM

## 2025-08-13 DIAGNOSIS — N20.0 URIC ACID NEPHROLITHIASIS: ICD-10-CM

## 2025-08-13 LAB
ABSOLUTE EOSINOPHIL (OHS): 0.16 K/UL
ABSOLUTE MONOCYTE (OHS): 0.35 K/UL (ref 0.3–1)
ABSOLUTE NEUTROPHIL COUNT (OHS): 2.55 K/UL (ref 1.8–7.7)
ALBUMIN SERPL BCP-MCNC: 3.6 G/DL (ref 3.5–5.2)
ANION GAP (OHS): 8 MMOL/L (ref 8–16)
BACTERIA #/AREA URNS HPF: NORMAL /HPF
BASOPHILS # BLD AUTO: 0.02 K/UL
BASOPHILS NFR BLD AUTO: 0.5 %
BUN SERPL-MCNC: 34 MG/DL (ref 8–23)
CALCIUM SERPL-MCNC: 8.8 MG/DL (ref 8.7–10.5)
CHLORIDE SERPL-SCNC: 109 MMOL/L (ref 95–110)
CO2 SERPL-SCNC: 25 MMOL/L (ref 23–29)
CREAT SERPL-MCNC: 1.7 MG/DL (ref 0.5–1.4)
CREAT UR-MCNC: 145 MG/DL (ref 23–375)
ERYTHROCYTE [DISTWIDTH] IN BLOOD BY AUTOMATED COUNT: 12.8 % (ref 11.5–14.5)
GFR SERPLBLD CREATININE-BSD FMLA CKD-EPI: 40 ML/MIN/1.73/M2
GLUCOSE SERPL-MCNC: 111 MG/DL (ref 70–110)
HCT VFR BLD AUTO: 33.2 % (ref 40–54)
HGB BLD-MCNC: 10.9 GM/DL (ref 14–18)
IMM GRANULOCYTES # BLD AUTO: 0.01 K/UL (ref 0–0.04)
IMM GRANULOCYTES NFR BLD AUTO: 0.2 % (ref 0–0.5)
LYMPHOCYTES # BLD AUTO: 1.09 K/UL (ref 1–4.8)
MCH RBC QN AUTO: 31.1 PG (ref 27–31)
MCHC RBC AUTO-ENTMCNC: 32.8 G/DL (ref 32–36)
MCV RBC AUTO: 95 FL (ref 82–98)
MICROSCOPIC COMMENT: NORMAL
NUCLEATED RBC (/100WBC) (OHS): 0 /100 WBC
PHOSPHATE SERPL-MCNC: 3.3 MG/DL (ref 2.7–4.5)
PLATELET # BLD AUTO: 164 K/UL (ref 150–450)
PMV BLD AUTO: 9.9 FL (ref 9.2–12.9)
POTASSIUM SERPL-SCNC: 4.2 MMOL/L (ref 3.5–5.1)
PROT UR-MCNC: <7 MG/DL
PROT/CREAT UR: NORMAL MG/G{CREAT}
RBC # BLD AUTO: 3.5 M/UL (ref 4.6–6.2)
RBC #/AREA URNS HPF: 0 /HPF (ref 0–4)
RELATIVE EOSINOPHIL (OHS): 3.8 %
RELATIVE LYMPHOCYTE (OHS): 26.1 % (ref 18–48)
RELATIVE MONOCYTE (OHS): 8.4 % (ref 4–15)
RELATIVE NEUTROPHIL (OHS): 61 % (ref 38–73)
SODIUM SERPL-SCNC: 142 MMOL/L (ref 136–145)
SQUAMOUS #/AREA URNS HPF: 3 /HPF
WBC # BLD AUTO: 4.18 K/UL (ref 3.9–12.7)
WBC #/AREA URNS HPF: 0 /HPF (ref 0–5)

## 2025-08-13 PROCEDURE — 82570 ASSAY OF URINE CREATININE: CPT

## 2025-08-13 PROCEDURE — 84165 PROTEIN E-PHORESIS SERUM: CPT

## 2025-08-13 PROCEDURE — 82306 VITAMIN D 25 HYDROXY: CPT

## 2025-08-13 PROCEDURE — 84165 PROTEIN E-PHORESIS SERUM: CPT | Mod: ,,, | Performed by: PATHOLOGY

## 2025-08-13 PROCEDURE — 36415 COLL VENOUS BLD VENIPUNCTURE: CPT

## 2025-08-13 PROCEDURE — 81000 URINALYSIS NONAUTO W/SCOPE: CPT

## 2025-08-13 PROCEDURE — 82565 ASSAY OF CREATININE: CPT

## 2025-08-13 PROCEDURE — 85025 COMPLETE CBC W/AUTO DIFF WBC: CPT

## 2025-08-13 PROCEDURE — 83970 ASSAY OF PARATHORMONE: CPT

## 2025-08-14 LAB
25(OH)D3+25(OH)D2 SERPL-MCNC: 50 NG/ML (ref 30–96)
ALBUMIN, SPE (OHS): 3.76 G/DL (ref 3.35–5.55)
ALPHA 1 GLOB (OHS): 0.33 GM/DL (ref 0.17–0.41)
ALPHA 2 GLOB (OHS): 0.81 GM/DL (ref 0.43–0.99)
BETA GLOB (OHS): 0.79 GM/DL (ref 0.5–1.1)
GAMMA GLOBULIN (OHS): 0.71 GM/DL (ref 0.67–1.58)
PROT SERPL-MCNC: 6.4 GM/DL (ref 6–8.4)
PTH-INTACT SERPL-MCNC: 86.7 PG/ML (ref 9–77)

## 2025-08-15 ENCOUNTER — LAB VISIT (OUTPATIENT)
Dept: LAB | Facility: HOSPITAL | Age: 80
End: 2025-08-15
Attending: INTERNAL MEDICINE
Payer: MEDICARE

## 2025-08-15 DIAGNOSIS — N20.0 URIC ACID NEPHROLITHIASIS: ICD-10-CM

## 2025-08-15 DIAGNOSIS — N18.30 CHRONIC KIDNEY DISEASE, STAGE III (MODERATE): ICD-10-CM

## 2025-08-15 DIAGNOSIS — N28.1 ACQUIRED CYST OF KIDNEY: ICD-10-CM

## 2025-08-15 DIAGNOSIS — N28.0 THROMBOEMBOLISM OF RENAL ARTERIES: ICD-10-CM

## 2025-08-15 PROCEDURE — 84166 PROTEIN E-PHORESIS/URINE/CSF: CPT

## 2025-08-16 LAB — PATHOLOGIST REVIEW - SPE (OHS): NORMAL

## 2025-08-18 LAB
PROT 24H UR-MRATE: NORMAL G/(24.H)
PROT UR-MCNC: <7 MG/DL
TOTAL HOURS OF COLLECTION (OHS): 8 HR
TOTAL VOLUME  (OHS): 900 ML

## 2025-08-19 LAB — PATHOLOGIST INTERPRETATION - UPE (OHS): NORMAL

## (undated) DEVICE — GLOVE SENSICARE PI SURG 7.5

## (undated) DEVICE — CANISTER SUCTION RIGID 1200CC

## (undated) DEVICE — CATH ESOPH 7.5X15-18X5.5X240

## (undated) DEVICE — FORCEP BIOPSY ENDO 2.8MM 240CM

## (undated) DEVICE — SOL IRRI STRL WATER 1000ML

## (undated) DEVICE — BITE BLOCK ADULT LATEX FREE

## (undated) DEVICE — UNDERGLOVE BIOGEL PI SZ 6.5 LF

## (undated) DEVICE — SNARE SNAREMASTER PLUS 15MM

## (undated) DEVICE — TRAP MUCUS SPECIMEN 80CC

## (undated) DEVICE — COMPLIANCE ENDOKIT